# Patient Record
Sex: MALE | Race: WHITE | Employment: OTHER | URBAN - METROPOLITAN AREA
[De-identification: names, ages, dates, MRNs, and addresses within clinical notes are randomized per-mention and may not be internally consistent; named-entity substitution may affect disease eponyms.]

---

## 2018-01-08 ENCOUNTER — OFFICE VISIT (OUTPATIENT)
Dept: FAMILY MEDICINE CLINIC | Facility: CLINIC | Age: 68
End: 2018-01-08

## 2018-01-08 VITALS
TEMPERATURE: 98 F | OXYGEN SATURATION: 98 % | DIASTOLIC BLOOD PRESSURE: 74 MMHG | RESPIRATION RATE: 16 BRPM | HEART RATE: 76 BPM | SYSTOLIC BLOOD PRESSURE: 138 MMHG

## 2018-01-08 DIAGNOSIS — R68.89 FLU-LIKE SYMPTOMS: Primary | ICD-10-CM

## 2018-01-08 PROCEDURE — 99202 OFFICE O/P NEW SF 15 MIN: CPT | Performed by: PHYSICIAN ASSISTANT

## 2018-01-08 RX ORDER — BENZONATATE 200 MG/1
200 CAPSULE ORAL 3 TIMES DAILY PRN
Qty: 30 CAPSULE | Refills: 0 | Status: SHIPPED | OUTPATIENT
Start: 2018-01-08 | End: 2019-01-07

## 2018-01-08 RX ORDER — AMOXICILLIN AND CLAVULANATE POTASSIUM 875; 125 MG/1; MG/1
1 TABLET, FILM COATED ORAL 2 TIMES DAILY
Qty: 20 TABLET | Refills: 0 | Status: SHIPPED | OUTPATIENT
Start: 2018-01-08 | End: 2018-01-18

## 2018-01-09 NOTE — PATIENT INSTRUCTIONS
Influenza  Influenza (“the flu”) is an infection that affects your respiratory tract (the mouth, nose, and lungs, and the passages between them). Unlike a cold, the flu can make you very ill. And it can lead to pneumonia, a serious lung infection.  For · Drink lots of fluids such as water, juice, and warm soup. A good rule is to drink enough so that you urinate your normal amount. · Get plenty of rest.  · Ask your health care provider what to take for fever and pain.   · Call your provider if your fever Handwashing is one of the best ways to prevent many common infections. If you’re caring for or visiting someone with the flu, wash your hands each time you enter and leave the room. Follow these steps:  · Use warm water and plenty of soap.  Rub your hands t

## 2018-01-09 NOTE — PROGRESS NOTES
CHIEF COMPLAINT:   Patient presents with:  URI: painful cough, yellow mucus, feverish feeling. 5 days. started with head cold.        HPI:      Jay Jay José is a 74year old male who presents complaining developing symptoms of sore throat and sinus for icteric, conjunctiva clear  EARS: TM's non erythematous  NOSE: Nasal mucosa congested   THROAT: Posterior pharynx is  erythematous, small PND, no exudates.      NECK: Supple, non-tender, small tonsillar LAD  LUNGS:CTA  R/R/W  CARDIO: S1S/2 RRR  GI: soft NT

## 2019-04-10 PROCEDURE — 81275 KRAS GENE VARIANTS EXON 2: CPT | Performed by: INTERNAL MEDICINE

## 2019-04-10 PROCEDURE — 81276 KRAS GENE ADDL VARIANTS: CPT | Performed by: INTERNAL MEDICINE

## 2019-04-10 PROCEDURE — 88307 TISSUE EXAM BY PATHOLOGIST: CPT | Performed by: INTERNAL MEDICINE

## 2019-04-10 PROCEDURE — 81301 MICROSATELLITE INSTABILITY: CPT | Performed by: INTERNAL MEDICINE

## 2019-04-10 PROCEDURE — 81311 NRAS GENE VARIANTS EXON 2&3: CPT | Performed by: INTERNAL MEDICINE

## 2019-04-10 PROCEDURE — 88341 IMHCHEM/IMCYTCHM EA ADD ANTB: CPT | Performed by: INTERNAL MEDICINE

## 2019-04-10 PROCEDURE — 88381 MICRODISSECTION MANUAL: CPT | Performed by: INTERNAL MEDICINE

## 2019-04-10 PROCEDURE — 88342 IMHCHEM/IMCYTCHM 1ST ANTB: CPT | Performed by: INTERNAL MEDICINE

## 2019-04-18 PROBLEM — C78.02 MALIGNANT NEOPLASM METASTATIC TO LEFT LUNG (HCC): Status: ACTIVE | Noted: 2019-04-18

## 2019-04-18 PROBLEM — C78.01 MALIGNANT NEOPLASM METASTATIC TO RIGHT LUNG (HCC): Status: ACTIVE | Noted: 2019-04-18

## 2019-04-18 PROBLEM — C20 RECTAL CANCER (HCC): Status: ACTIVE | Noted: 2019-04-18

## 2019-04-18 PROBLEM — C78.7 LIVER METASTASIS (HCC): Status: ACTIVE | Noted: 2019-04-18

## 2019-05-29 ENCOUNTER — HOSPITAL ENCOUNTER (EMERGENCY)
Facility: HOSPITAL | Age: 69
Discharge: HOME | End: 2019-05-29
Attending: EMERGENCY MEDICINE
Payer: COMMERCIAL

## 2019-05-29 VITALS
TEMPERATURE: 98 F | RESPIRATION RATE: 16 BRPM | HEART RATE: 65 BPM | WEIGHT: 159 LBS | DIASTOLIC BLOOD PRESSURE: 107 MMHG | OXYGEN SATURATION: 96 % | SYSTOLIC BLOOD PRESSURE: 165 MMHG | BODY MASS INDEX: 24.1 KG/M2 | HEIGHT: 68 IN

## 2019-05-29 DIAGNOSIS — K22.4 ESOPHAGEAL SPASM: Primary | ICD-10-CM

## 2019-05-29 PROCEDURE — 99283 EMERGENCY DEPT VISIT LOW MDM: CPT

## 2019-05-29 RX ORDER — OMEPRAZOLE 20 MG/1
20 CAPSULE, DELAYED RELEASE ORAL
Qty: 30 CAPSULE | Refills: 0 | Status: SHIPPED | OUTPATIENT
Start: 2019-05-29 | End: 2019-07-28

## 2019-05-29 RX ORDER — BACLOFEN 20 MG/1
20 TABLET ORAL 3 TIMES DAILY
Qty: 15 TABLET | Refills: 0 | Status: SHIPPED | OUTPATIENT
Start: 2019-05-29 | End: 2019-06-03

## 2019-05-29 NOTE — ED PROVIDER NOTES
Patient Seen in: [facility] Emergency Department    History   Patient presents with: Allergic Rxn Allergies (immune)    Stated Complaint: throat swelling karen after chemo    HPI    Esophagus closing up today after chemo.   61-year-old man presents to tobacco: Never Used    Alcohol use: No    Drug use: No      Review of Systems    Positive for stated complaint: throat swelling karen after chemo  Other systems are as noted in HPI. Constitutional and vital signs reviewed.       All other systems reviewed an Prescribed:  Discharge Medication List as of 5/29/2019  8:05 PM    START taking these medications    baclofen 20 MG Oral Tab  Take 1 tablet (20 mg total) by mouth 3 (three) times daily for 5 days. , Normal, Disp-15 tablet, R-0    omeprazole 20 MG Oral Capsu

## 2019-05-29 NOTE — ED INITIAL ASSESSMENT (HPI)
Finished chemotherapy at approximately 1330 / patient reports drinking \"weak gatorade solution\" then throat \"shut and I fell to the ground wheezing\" pt reports after tipping head back he could breathe better / ems called to house and directed patient t

## 2019-06-11 PROBLEM — D70.1 CHEMOTHERAPY INDUCED NEUTROPENIA (HCC): Status: ACTIVE | Noted: 2019-06-11

## 2019-06-11 PROBLEM — T45.1X5A CHEMOTHERAPY INDUCED NEUTROPENIA (HCC): Status: ACTIVE | Noted: 2019-06-11

## 2019-10-28 PROBLEM — D69.6 THROMBOCYTOPENIA (HCC): Status: ACTIVE | Noted: 2019-10-28

## 2019-12-08 PROBLEM — R53.1 GENERAL WEAKNESS: Status: ACTIVE | Noted: 2019-12-08

## 2020-10-07 ENCOUNTER — APPOINTMENT (OUTPATIENT)
Dept: CT IMAGING | Facility: HOSPITAL | Age: 70
DRG: 391 | End: 2020-10-07
Attending: EMERGENCY MEDICINE
Payer: COMMERCIAL

## 2020-10-07 ENCOUNTER — HOSPITAL ENCOUNTER (INPATIENT)
Facility: HOSPITAL | Age: 70
LOS: 7 days | Discharge: HOME HEALTH | DRG: 391 | End: 2020-10-16
Attending: EMERGENCY MEDICINE | Admitting: INTERNAL MEDICINE
Payer: COMMERCIAL

## 2020-10-07 DIAGNOSIS — E87.1 HYPONATREMIA: ICD-10-CM

## 2020-10-07 DIAGNOSIS — R10.84 ABDOMINAL PAIN, GENERALIZED: Primary | ICD-10-CM

## 2020-10-07 DIAGNOSIS — K52.9 COLITIS: ICD-10-CM

## 2020-10-07 PROCEDURE — 99285 EMERGENCY DEPT VISIT HI MDM: CPT

## 2020-10-07 PROCEDURE — 96374 THER/PROPH/DIAG INJ IV PUSH: CPT

## 2020-10-07 PROCEDURE — 74177 CT ABD & PELVIS W/CONTRAST: CPT | Performed by: EMERGENCY MEDICINE

## 2020-10-07 PROCEDURE — 96375 TX/PRO/DX INJ NEW DRUG ADDON: CPT

## 2020-10-07 PROCEDURE — 80053 COMPREHEN METABOLIC PANEL: CPT | Performed by: EMERGENCY MEDICINE

## 2020-10-07 PROCEDURE — 96376 TX/PRO/DX INJ SAME DRUG ADON: CPT

## 2020-10-07 PROCEDURE — 83690 ASSAY OF LIPASE: CPT | Performed by: EMERGENCY MEDICINE

## 2020-10-07 PROCEDURE — 96361 HYDRATE IV INFUSION ADD-ON: CPT

## 2020-10-07 PROCEDURE — 81001 URINALYSIS AUTO W/SCOPE: CPT | Performed by: EMERGENCY MEDICINE

## 2020-10-07 PROCEDURE — 85025 COMPLETE CBC W/AUTO DIFF WBC: CPT | Performed by: EMERGENCY MEDICINE

## 2020-10-07 PROCEDURE — 83930 ASSAY OF BLOOD OSMOLALITY: CPT | Performed by: INTERNAL MEDICINE

## 2020-10-07 RX ORDER — SODIUM CHLORIDE 9 MG/ML
INJECTION, SOLUTION INTRAVENOUS CONTINUOUS
Status: DISCONTINUED | OUTPATIENT
Start: 2020-10-07 | End: 2020-10-07

## 2020-10-07 RX ORDER — SODIUM CHLORIDE 9 MG/ML
INJECTION, SOLUTION INTRAVENOUS CONTINUOUS
Status: DISCONTINUED | OUTPATIENT
Start: 2020-10-07 | End: 2020-10-11

## 2020-10-07 RX ORDER — ACETAMINOPHEN 325 MG/1
650 TABLET ORAL EVERY 6 HOURS PRN
Status: DISCONTINUED | OUTPATIENT
Start: 2020-10-07 | End: 2020-10-16

## 2020-10-07 RX ORDER — MORPHINE SULFATE 4 MG/ML
4 INJECTION, SOLUTION INTRAMUSCULAR; INTRAVENOUS ONCE
Status: COMPLETED | OUTPATIENT
Start: 2020-10-07 | End: 2020-10-07

## 2020-10-07 RX ORDER — PANTOPRAZOLE SODIUM 40 MG/1
40 TABLET, DELAYED RELEASE ORAL DAILY
Status: ON HOLD | COMMUNITY
End: 2020-11-09 | Stop reason: SDUPTHER

## 2020-10-07 RX ORDER — BISACODYL 10 MG
10 SUPPOSITORY, RECTAL RECTAL
Status: DISCONTINUED | OUTPATIENT
Start: 2020-10-07 | End: 2020-10-16

## 2020-10-07 RX ORDER — ONDANSETRON 2 MG/ML
4 INJECTION INTRAMUSCULAR; INTRAVENOUS ONCE
Status: COMPLETED | OUTPATIENT
Start: 2020-10-07 | End: 2020-10-07

## 2020-10-07 RX ORDER — ONDANSETRON 2 MG/ML
4 INJECTION INTRAMUSCULAR; INTRAVENOUS EVERY 4 HOURS PRN
Status: DISCONTINUED | OUTPATIENT
Start: 2020-10-07 | End: 2020-10-07 | Stop reason: HOSPADM

## 2020-10-07 RX ORDER — LISINOPRIL 10 MG/1
10 TABLET ORAL DAILY
Status: DISCONTINUED | OUTPATIENT
Start: 2020-10-08 | End: 2020-10-09

## 2020-10-07 RX ORDER — SODIUM PHOSPHATE, DIBASIC AND SODIUM PHOSPHATE, MONOBASIC 7; 19 G/133ML; G/133ML
1 ENEMA RECTAL ONCE AS NEEDED
Status: DISCONTINUED | OUTPATIENT
Start: 2020-10-07 | End: 2020-10-16

## 2020-10-07 RX ORDER — GABAPENTIN 300 MG/1
300 CAPSULE ORAL 3 TIMES DAILY
Status: DISCONTINUED | OUTPATIENT
Start: 2020-10-07 | End: 2020-10-16

## 2020-10-07 RX ORDER — ONDANSETRON 2 MG/ML
4 INJECTION INTRAMUSCULAR; INTRAVENOUS EVERY 6 HOURS PRN
Status: DISCONTINUED | OUTPATIENT
Start: 2020-10-07 | End: 2020-10-16

## 2020-10-07 RX ORDER — METOCLOPRAMIDE HYDROCHLORIDE 5 MG/ML
10 INJECTION INTRAMUSCULAR; INTRAVENOUS EVERY 8 HOURS PRN
Status: DISCONTINUED | OUTPATIENT
Start: 2020-10-07 | End: 2020-10-16

## 2020-10-07 RX ORDER — ENOXAPARIN SODIUM 100 MG/ML
40 INJECTION SUBCUTANEOUS DAILY
Status: DISCONTINUED | OUTPATIENT
Start: 2020-10-07 | End: 2020-10-16

## 2020-10-07 RX ORDER — POLYETHYLENE GLYCOL 3350 17 G/17G
17 POWDER, FOR SOLUTION ORAL DAILY PRN
Status: DISCONTINUED | OUTPATIENT
Start: 2020-10-07 | End: 2020-10-16

## 2020-10-07 RX ORDER — DOCUSATE SODIUM 100 MG/1
100 CAPSULE, LIQUID FILLED ORAL 2 TIMES DAILY
Status: DISCONTINUED | OUTPATIENT
Start: 2020-10-07 | End: 2020-10-16

## 2020-10-07 RX ORDER — SODIUM CHLORIDE 9 MG/ML
INJECTION, SOLUTION INTRAVENOUS CONTINUOUS
Status: ACTIVE | OUTPATIENT
Start: 2020-10-07 | End: 2020-10-07

## 2020-10-07 RX ORDER — MORPHINE SULFATE 4 MG/ML
4 INJECTION, SOLUTION INTRAMUSCULAR; INTRAVENOUS
Status: DISCONTINUED | OUTPATIENT
Start: 2020-10-07 | End: 2020-10-07 | Stop reason: HOSPADM

## 2020-10-07 RX ORDER — PANTOPRAZOLE SODIUM 40 MG/1
40 TABLET, DELAYED RELEASE ORAL
Status: DISCONTINUED | OUTPATIENT
Start: 2020-10-08 | End: 2020-10-16

## 2020-10-07 NOTE — ED PROVIDER NOTES
Patient Seen in: [facility] Emergency Department      History   Patient presents with:  Abdomen/Flank Pain    Stated Complaint: abd pain    HPI    82-year-old with past medical history of stage IV rectal cancer, currently on chemo last 8 days ago, h Constitutional:       Appearance: Normal appearance. HENT:      Head: Normocephalic and atraumatic. Nose: Nose normal.      Mouth/Throat:      Mouth: Mucous membranes are moist.   Eyes:      Extraocular Movements: Extraocular movements intact. PLATELET.   Procedure                               Abnormality         Status                     ---------                               -----------         ------                     CBC W/ DIFFERENTIAL[[id number]]          Abnormal            Final resul the lower pole right kidney measuring up to 2.5 cm. AORTA/VASCULAR:  Mild mixed plaque in the aorta and iliac arteries. RETROPERITONEUM:  Unremarkable. BOWEL/MESENTERY:  Small hiatal hernia. The appendix is dilated and filled with multiple appendicoliths. Clinical correlation recommended. 2. There is mild to moderate thickening of the rectosigmoid colon with underlying nonspecific colitis and/or neoplasm not entirely excluded. Clinical correlation recommended.   3. Urinary bladder wall thickening is concer from his chemotherapy. I reviewed case with Dr. Zenobia Quintana of general surgery. We reviewed patient's history and CT finding. He requested that patient not be started antibiotics until he can be assessed by surgery.   Patient and wife updated on results and ag

## 2020-10-07 NOTE — ED NOTES
Assumed care of patient from [facility].  Pt resting comfortably, states feeling better, updated on POC, NAD

## 2020-10-07 NOTE — CONSULTS
[facility]  Report of Consultation    Sydnee Marycruz Patient Status:  Emergency    1950 MRN [id number]   Location [address] [facility] Attending Perri Dolan Day # 0 PCP Josey Haas MD     Reason for Systems:    GENERAL HEALTH: otherwise feels well, no weight loss, no fever or chills  SKIN: denies any unusual skin rashes or jaundice  HEENT: denies nasal congestion, sinus pain or sore throat; hearing loss negative  RESPIRATORY: denies shortness of breat abnormal but does not appear acutely inflammed nor is his clinical picture consistent with acute appendicitis. Agree with admission for observation and fluids. Will follow.        Harmony Pascual MD  10/7/2020  6:15 PM

## 2020-10-07 NOTE — ED INITIAL ASSESSMENT (HPI)
Pt c/o abdominal cramps with chills yesterday,       pts last chemo last Tuesday, pt is on a new chemo med.

## 2020-10-08 ENCOUNTER — APPOINTMENT (OUTPATIENT)
Dept: GENERAL RADIOLOGY | Facility: HOSPITAL | Age: 70
DRG: 391 | End: 2020-10-08
Attending: INTERNAL MEDICINE
Payer: COMMERCIAL

## 2020-10-08 PROCEDURE — 87077 CULTURE AEROBIC IDENTIFY: CPT | Performed by: INTERNAL MEDICINE

## 2020-10-08 PROCEDURE — 80053 COMPREHEN METABOLIC PANEL: CPT | Performed by: HOSPITALIST

## 2020-10-08 PROCEDURE — 82570 ASSAY OF URINE CREATININE: CPT | Performed by: INTERNAL MEDICINE

## 2020-10-08 PROCEDURE — 87040 BLOOD CULTURE FOR BACTERIA: CPT | Performed by: INTERNAL MEDICINE

## 2020-10-08 PROCEDURE — 87076 CULTURE ANAEROBE IDENT EACH: CPT | Performed by: INTERNAL MEDICINE

## 2020-10-08 PROCEDURE — 80048 BASIC METABOLIC PNL TOTAL CA: CPT | Performed by: HOSPITALIST

## 2020-10-08 PROCEDURE — 84550 ASSAY OF BLOOD/URIC ACID: CPT | Performed by: INTERNAL MEDICINE

## 2020-10-08 PROCEDURE — 85025 COMPLETE CBC W/AUTO DIFF WBC: CPT | Performed by: INTERNAL MEDICINE

## 2020-10-08 PROCEDURE — 87086 URINE CULTURE/COLONY COUNT: CPT | Performed by: INTERNAL MEDICINE

## 2020-10-08 PROCEDURE — 71045 X-RAY EXAM CHEST 1 VIEW: CPT | Performed by: INTERNAL MEDICINE

## 2020-10-08 PROCEDURE — 83605 ASSAY OF LACTIC ACID: CPT | Performed by: INTERNAL MEDICINE

## 2020-10-08 PROCEDURE — 85025 COMPLETE CBC W/AUTO DIFF WBC: CPT | Performed by: HOSPITALIST

## 2020-10-08 PROCEDURE — 84133 ASSAY OF URINE POTASSIUM: CPT | Performed by: INTERNAL MEDICINE

## 2020-10-08 PROCEDURE — 83935 ASSAY OF URINE OSMOLALITY: CPT | Performed by: INTERNAL MEDICINE

## 2020-10-08 PROCEDURE — 84300 ASSAY OF URINE SODIUM: CPT | Performed by: INTERNAL MEDICINE

## 2020-10-08 PROCEDURE — 87150 DNA/RNA AMPLIFIED PROBE: CPT | Performed by: INTERNAL MEDICINE

## 2020-10-08 RX ORDER — VANCOMYCIN HYDROCHLORIDE 125 MG/1
125 CAPSULE ORAL EVERY 6 HOURS
Status: DISCONTINUED | OUTPATIENT
Start: 2020-10-08 | End: 2020-10-16

## 2020-10-08 RX ORDER — SODIUM CHLORIDE 1000 MG
2 TABLET, SOLUBLE MISCELLANEOUS
Status: DISCONTINUED | OUTPATIENT
Start: 2020-10-08 | End: 2020-10-11

## 2020-10-08 RX ORDER — DICYCLOMINE HYDROCHLORIDE 10 MG/1
10 CAPSULE ORAL
Status: DISCONTINUED | OUTPATIENT
Start: 2020-10-08 | End: 2020-10-16

## 2020-10-08 NOTE — PROGRESS NOTES
NURSING ADMISSION NOTE      Patient admitted via Cart  Oriented to room. Safety precautions initiated. Bed in low position. Call light in reach. Assumed care of pt at 2100. Pt here with abdominal pain and diarrhea. Chemo was started 8 days ago.  H

## 2020-10-08 NOTE — H&P
[facility] Hospitalist History and Physical      Patient presents with:  Abdomen/Flank Pain       PCP: Margaret Serrano MD      History of Present Illness: Patient is a 78year old male with PMH sig for stage IV rectal CA on chemo (last dose 9 days ago) and HT Sister         Breast       Review of Systems  Comprehensive ROS reviewed and negative except for what is stated in HPI.       OBJECTIVE:  /67 (BP Location: Left arm)   Pulse 64   Temp 98.1 °F (36.7 °C) (Oral)   Resp 16   Ht 5' 8\" (1.727 m)   Wt 140 transcribed by Technologist)  Patient complains of severe cramping all four quadrants of his abdomen since his last infusion eight days ago.    CONTRAST USED:  61cc of Omnipaque 350  FINDINGS: LUNG BASE:  Scattered pulmonary metastases are again noted in th up to 3.1 x 1.9 cm in the largest focal area on the left measuring up to 1.9 x 1.7 cm which is nonspecific and could be postprocedural, with metastatic implants not entirely excluded. Clinical correlation recommended.  PELVIC ORGANS:  Urinary bladder wall Assessment/Plan:     78 yr old male with PMH sig for stage IV rectal CA on chemo (last dose 9 days ago) and HTN presented to the ED c/o abd pain.      # Abdominal pain with dilated appendix  - appreciate surgery eval, does not feel this represents acute

## 2020-10-08 NOTE — PROGRESS NOTES
[facility]  Progress Note    Babita Kunz Patient Status:  Observation    1950 MRN [id number]   [facility] 4NW-A Attending Mikal Gates,    Hosp Day # 0 PCP Lambert Tee MD     Subjective:    Patient noted nausea/ colorectal ca.    Will review with Jani Granados [facility] Surgery  10/8/2020

## 2020-10-08 NOTE — CONSULTS
[facility]    Report of Consultation    Júnior Ventura Patient Status:  Observation    1950 MRN [id number]   [facility] 4NW-A Attending Ziggy Padilla,    Hosp Day # 0 PCP Aurelia Wise MD       REASON FOR CONSULT: 40 mg, Oral, QAM AC  •  0.9% NaCl infusion, , Intravenous, Continuous  •  Enoxaparin Sodium (LOVENOX) 40 MG/0.4ML injection 40 mg, 40 mg, Subcutaneous, Daily  •  acetaminophen (TYLENOL) tab 650 mg, 650 mg, Oral, Q6H PRN  •  docusate sodium (COLACE) cap 100 CREATSERUM 1.08 10/08/2020    ANIONGAP 5 10/08/2020    GFRNAA 70 10/08/2020    GFRAA 81 10/08/2020    CA 8.3 (L) 10/08/2020    OSMOCALC 263 (L) 10/08/2020    ALKPHO 186 (H) 10/07/2020    AST 42 (H) 10/07/2020    ALT 30 10/07/2020    BILT 0.5 10/07/2020 to admission.  Nephrology consulted for hyponatremia:    Hyponatremia: acute on chronic - may be in the setting of poor solute intake but also consider SIADH in the setting of pulmonary and intracranial metastases  -- check urine osm, urine sodium, urine cr

## 2020-10-08 NOTE — PLAN OF CARE
Problem: PAIN - ADULT  Goal: Verbalizes/displays adequate comfort level or patient's stated pain goal  Description: INTERVENTIONS:  - Encourage pt to monitor pain and request assistance  - Assess pain using appropriate pain scale  - Administer analgesics consumed  - Identify factors contributing to decreased intake, treat as appropriate  - Assist with meals as needed  - Monitor I&O, WT and lab values  - Obtain nutritional consult as needed  - Optimize oral hygiene and moisture  - Encourage food from home; [id number] notified of Neutropenia. ID consult ordered. Dr. Tawana Plummer called for consult. Agrees w/ Zosyn, PO vancomycin added as well.

## 2020-10-08 NOTE — CONSULTS
INFECTIOUS DISEASE CONSULT NOTE    Aimee Tracy Patient Status:  Observation    1950 MRN [id number]   [facility] 4NW-A Attending Yoni Long, DO   Hosp Day # 0 PCP Sherrie Ellis Per Dr. Reddy, no other recommendations. Patient and wife given recommendations.    Medications:   •  Dicyclomine HCl (BENTYL) cap 10 mg, 10 mg, Oral, TID AC&HS  •  sodium chloride tab 2 g, 2 g, Oral, TID CC  •  Piperacillin Sod-Tazobactam So (ZOSYN) 3.375 g in dextrose 5 % 100 mL ADD-vantage, 3.375 g, Intravenous, Q8H  •  gabapentin (ELISABETH arthritis. Neurological: Negative for headaches, dizziness, focal neuro deficits  Behavioral/Psych: Negative for anxiety or depression    Physical Exam:    General: No acute distress.  Sleeping, wakes up but not giving much info  Vital signs: Blood pressur Microbiology    Reviewed in EMR,     Radiology: Ct Abdomen Pelvis Iv Contrast, No Oral (er)    Result Date: 10/7/2020  PROCEDURE:  CT ABDOMEN PELVIS IV CONTRAST, NO ORAL (ER)  COMPARISON:  Outside exam from 8/3/2020.   INDICATIONS:  abd pain  TECHNIQUE: diameter. An underlying mucocele or acute appendicitis are not entirely excluded. Clinical correlation recommended. There is mild to moderate thickening of the rectosigmoid colon with underlying nonspecific colitis and/or neoplasm not entirely excluded. disease again noted. 6. Scattered small amount of abdominal and pelvic ascites. 7. There are changes of a previous vasectomy.   There is stable nonspecific nodular thickening along the bilateral spermatic cords with the largest focal area on the right martine

## 2020-10-09 ENCOUNTER — APPOINTMENT (OUTPATIENT)
Dept: GENERAL RADIOLOGY | Facility: HOSPITAL | Age: 70
DRG: 391 | End: 2020-10-09
Attending: INTERNAL MEDICINE
Payer: COMMERCIAL

## 2020-10-09 PROCEDURE — 74018 RADEX ABDOMEN 1 VIEW: CPT | Performed by: INTERNAL MEDICINE

## 2020-10-09 PROCEDURE — 85025 COMPLETE CBC W/AUTO DIFF WBC: CPT | Performed by: HOSPITALIST

## 2020-10-09 PROCEDURE — 80069 RENAL FUNCTION PANEL: CPT | Performed by: INTERNAL MEDICINE

## 2020-10-09 RX ORDER — SUCRALFATE ORAL 1 G/10ML
1 SUSPENSION ORAL
Status: DISCONTINUED | OUTPATIENT
Start: 2020-10-09 | End: 2020-10-16

## 2020-10-09 NOTE — PROGRESS NOTES
[facility]    Nephrology Progress Note    Jeff Calvin Attending:  Farshad Dobbins DO       SUBJECTIVE:     More sleepy this morning  Has some urinary retention  Blood pressures remain low  He has not been eating/drinking much    PHYSICAL EXAM: NEPRELIM 2.11 10/09/2020    NEUTABS 12.46 (H) 09/09/2019    LYMPHABS 3.61 09/09/2019    EOSABS 0.00 09/09/2019    BASABS 0.00 09/09/2019    NEPERCENT 84.0 10/09/2020    LYPERCENT 9.6 10/09/2020    MOPERCENT 4.4 10/09/2020    EOPERCENT 0.0 10/09/2020 Oral, Daily PRN    •  bisacodyl (DULCOLAX) rectal suppository 10 mg, 10 mg, Rectal, Daily PRN    •  Fleet Enema (FLEET) 7-19 GM/118ML enema 133 mL, 1 enema, Rectal, Once PRN    •  ondansetron HCl (ZOFRAN) injection 4 mg, 4 mg, Intravenous, Q6H PRN    •  Me

## 2020-10-09 NOTE — CONSULTS
[facility]  Report of Consultation    David Gilliland Patient Status:  Observation    1950 MRN [id number]   [facility] 4NW-A Attending Ridge Andersen DO   Hosp Day # 0 PCP Amara Price MD     REASON FOR CONSULTATION: vancomycin HCl (VANCOCIN) cap 125 mg, 125 mg, Oral, Q6H  •  gabapentin (NEURONTIN) cap 300 mg, 300 mg, Oral, TID  •  lisinopril tab 10 mg, 10 mg, Oral, Daily  •  Pantoprazole Sodium (PROTONIX) EC tab 40 mg, 40 mg, Oral, QAM AC  •  0.9% NaCl infusion, , Int UP:     Laboratory Data:      Recent Results (from the past 24 hour(s))   LACTIC ACID, PLASMA    Collection Time: 10/08/20  2:51 PM   Result Value Ref Range    Lactic Acid 1.3 0.4 - 2.0 mmol/L   COMP METABOLIC PANEL (14)    Collection Time: 10/08/20  2:51 Collection Time: 10/08/20  2:51 PM   Result Value Ref Range    Slide Review       Slide reviewed, normal WBC, RBC, and platelet morphology.    SODIUM, URINE, RANDOM    Collection Time: 10/08/20  4:20 PM   Result Value Ref Range    Na Urine Random 115 mmol/L Worsening hepatic metastatic disease. 5. Partially imaged scattered pulmonary metastatic disease again noted. 6. Scattered small amount of abdominal and pelvic ascites. 7. There are changes of a previous vasectomy.   There is stable nonspecific nodular t

## 2020-10-09 NOTE — PLAN OF CARE
Problem: METABOLIC/FLUID AND ELECTROLYTES - ADULT  Goal: Hemodynamic stability and optimal renal function maintained  Description: INTERVENTIONS:  - Monitor labs and assess for signs and symptoms of volume excess or deficit  - Monitor intake, output and

## 2020-10-09 NOTE — DIETARY NOTE
[facility]  NUTRITION INITIAL ASSESSMENT    Pt does not meet malnutrition criteria.     NUTRITION DIAGNOSIS/PROBLEM:  Inadequate oral intake related to stage IV rectal cancer increasing nutrient needs as evidenced by decreased appetite and PO intake mi calories/day (25-35 calories per kg)  Protein: 76-95 grams protein/day (1.2-1.5 grams protein per kg)  Fluid: ~1 ml/kcal or per MD discretion    MONITOR AND EVALUATE/NUTRITION GOALS:  1. PO intake to meet at least 75% patient nutrition prescription  2.  At

## 2020-10-09 NOTE — PLAN OF CARE
Problem: PAIN - ADULT  Goal: Verbalizes/displays adequate comfort level or patient's stated pain goal  Description: INTERVENTIONS:  - Encourage pt to monitor pain and request assistance  - Assess pain using appropriate pain scale  - Administer analgesics (undernourished)  Description: INTERVENTIONS:  - Monitor percentage of each meal consumed  - Identify factors contributing to decreased intake, treat as appropriate  - Assist with meals as needed  - Monitor I&O, WT and lab values  - Obtain nutritional cons pressure (other measures as available)  - Encourage oral intake as appropriate  - Instruct patient on fluid and nutrition restrictions as appropriate  10/9/2020 1558 by Greyson Marquez RN  Outcome: Progressing  10/9/2020 1200 by Gresyon Marquez RN

## 2020-10-09 NOTE — PLAN OF CARE
Patient received alert and oriented, slow to respond. Patient unable to void this shift, assisted X2 to sit up to side of bed at 0500. Patient states he's too weak to stand up. Reports he doesn't feel urge to urinate, Bladder scanner shows >400 ml.  Stra

## 2020-10-09 NOTE — PROGRESS NOTES
[facility] Hospitalist Progress Note                                                                   Michael Hernandez 89  9/2/1950    SUBJECTIVE: No chest pain, palpitation, shortness of breath, co Oral Daily   • Pantoprazole Sodium  40 mg Oral QAM AC   • enoxaparin  40 mg Subcutaneous Daily   • docusate sodium  100 mg Oral BID     Continuous Infusions:   • sodium chloride 100 mL/hr at 10/09/20 0547     PRN: acetaminophen, PEG 3350, magnesium hydroxi

## 2020-10-09 NOTE — PROGRESS NOTES
INFECTIOUS DISEASE PROGRESS NOTE    Jannet Bowie Patient Status:  Observation    1950 MRN [id number]   [facility] 4NW-A Attending Tashi Ariza DO   Hosp Day # 0 PCP Viral for 65 yrs & older inj 0.7ml, 0.7 mL, Intramuscular, Prior to discharge    Review of Systems:  Completed. See pertinent positives and negatives in the the HPI. Physical Exam:    General: No acute distress. Awake. Appears weak.   Vital signs: Blood pressu Negative   PHURINE 7.0   PROUR Negative   UROBILINOGEN 2.0   NITRITE Negative   LEUUR Negative        Microbiology    Reviewed in EMR,     Radiology: Reviewed. PROCEDURE: XR CHEST AP PORTABLE (CPT=71045)     TECHNIQUE: AP chest radiograph was obtained. representative metastasis in the inferior right hepatic lobe measuring 3.4 x 2.6 cm previously measured 1.9 x 1.4 cm. Additional hepatic cysts are seen measuring up to 4.7 cm. BILIARY:  Unremarkable. SPLEEN:  Unremarkable. PANCREAS:  Unremarkable.  Brandy Shepard 1 anterolisthesis of L5 on S1. OTHER:  None. CONCLUSION:   1. The appendix is dilated and filled with multiple appendicoliths. The appendix measures up to 2.5 cm in diameter, previously measuring up to 1.4 cm in diameter.   The largest appendico diarrhea now, but did have diarrhea and took imodium, ? cdiff now leading to colitis and constipation, mild distention on exam and decreased BS  4. Stage IV rectal cancer  5. LETY  6.  Urinary retention, required straight caths overnight    PLAN:  - follow B

## 2020-10-10 PROCEDURE — 85025 COMPLETE CBC W/AUTO DIFF WBC: CPT | Performed by: HOSPITALIST

## 2020-10-10 PROCEDURE — 85027 COMPLETE CBC AUTOMATED: CPT | Performed by: HOSPITALIST

## 2020-10-10 PROCEDURE — 84132 ASSAY OF SERUM POTASSIUM: CPT | Performed by: HOSPITALIST

## 2020-10-10 PROCEDURE — 85007 BL SMEAR W/DIFF WBC COUNT: CPT | Performed by: HOSPITALIST

## 2020-10-10 PROCEDURE — 83735 ASSAY OF MAGNESIUM: CPT | Performed by: HOSPITALIST

## 2020-10-10 PROCEDURE — 80069 RENAL FUNCTION PANEL: CPT | Performed by: INTERNAL MEDICINE

## 2020-10-10 RX ORDER — POTASSIUM CHLORIDE 20 MEQ/1
40 TABLET, EXTENDED RELEASE ORAL EVERY 4 HOURS
Status: DISCONTINUED | OUTPATIENT
Start: 2020-10-10 | End: 2020-10-10

## 2020-10-10 RX ORDER — SENNOSIDES 8.6 MG
8.6 TABLET ORAL 2 TIMES DAILY
Status: DISCONTINUED | OUTPATIENT
Start: 2020-10-10 | End: 2020-10-13

## 2020-10-10 RX ORDER — POTASSIUM CHLORIDE 20 MEQ/1
40 TABLET, EXTENDED RELEASE ORAL ONCE
Status: COMPLETED | OUTPATIENT
Start: 2020-10-10 | End: 2020-10-10

## 2020-10-10 RX ORDER — SIMETHICONE 80 MG
80 TABLET,CHEWABLE ORAL
Status: DISCONTINUED | OUTPATIENT
Start: 2020-10-10 | End: 2020-10-16

## 2020-10-10 RX ORDER — POLYETHYLENE GLYCOL 3350 17 G/17G
17 POWDER, FOR SOLUTION ORAL DAILY
Status: DISCONTINUED | OUTPATIENT
Start: 2020-10-10 | End: 2020-10-13

## 2020-10-10 NOTE — PROGRESS NOTES
[facility] Hospitalist Progress Note                                                                   Michael Hernandez 89  9/2/1950    SUBJECTIVE: No chest pain, palpitation, shortness of breath, co mmol Intravenous Once   • simethicone  80 mg Oral TID CC and HS   • PEG 3350  17 g Oral Daily   • Senna  8.6 mg Oral BID   • sucralfate  1 g Oral TID AC and HS   • dicyclomine  10 mg Oral TID AC&HS   • sodium chloride  2 g Oral TID CC   • piperacillin-tazo and kphos replacement as per renal rec  -trend    # Metastatic rectal CA  - CT a/p showing worsening metastatic disease compared to August CT  - Oncology consulted     # Hyponatremia  - Na improved with sodium supplements   - cont NS   - renal following  -

## 2020-10-10 NOTE — PROGRESS NOTES
[facility]    Nephrology Progress Note    Geni Devries Attending:  Lobo Velazco DO       SUBJECTIVE:     More alert today  Low PO intake still  Has no issues with angel    PHYSICAL EXAM:     Vital Signs: /79 (BP Location: Left arm)   P Fis above normal  Nutrition recommendations include reducing portion sizes  [facility] HEALTH MAINTENANCE OFFICE VISIT  [facility] - [facility]    NAME: Gladys Barclay  AGE: 26 y o  SEX: female  : 1997     DATE: 2020    Assessment and Plan     1  Well adult exam    2  Screening for cardiovascular condition  -     Comprehensive metabolic panel; Future  -     Lipid Panel with Direct LDL reflex; Future    3  Screening for HIV (human immunodeficiency virus)  -     [facility]- Human Immunodeficiency Virus 1/2 Antigen / Antibody ( Fourth Generation) with Reflex Testing; Future    4  BMI 27 0-27 9,adult        · Patient Counseling:   · Nutrition: Stressed importance of a well balanced diet, moderation of sodium/saturated fat, caloric balance and sufficient intake of fiber  · Exercise: Stressed the importance of regular exercise with a goal of 150 minutes per week  · Dental Health: Discussed daily flossing and brushing and regular dental visits     · Immunizations reviewed: Up To Date  · Discussed benefits of:  Screening labs   BMI Counseling: Body mass index is 27 66 kg/m²  Discussed with patient's BMI with her  The BMI is above normal  Nutrition recommendations include reducing portion sizes  Return for Next scheduled follow up          Chief Complaint     Chief Complaint   Patient presents with    Physical Exam     [email or web address]        History of Present Illness     Pt is here for a full physical  PT see DR Castorena       Well Adult Physical   Patient here for a comprehensive physical exam       Diet and Physical Activity  Diet: well balanced diet  Exercise: frequently      Depression Screen  PHQ-9 Depression Screening    PHQ-9:    Frequency of the following problems over the past two weeks:       Little interest or pleasure in doing things:  0 - not at all  Feeling down, depressed, or hopeless:  0 - not at all  PHQ-2 Score:  0          General 2.46 10/10/2020    LYMPHABS 0.22 (L) 10/10/2020    EOSABS 0.00 10/10/2020    BASABS 0.00 10/10/2020    NEUT 62 10/10/2020    LYMPH 8 10/10/2020    MON 4 10/10/2020    EOS 0 10/10/2020    BASO 0 10/10/2020    NEPERCENT 84.0 10/09/2020    LYPERCENT 9.6 10/09 Health  Hearing: Normal:  bilateral  Vision: wears glasses  Dental: regular dental visits    Reproductive Health  No issues       The following portions of the patient's history were reviewed and updated as appropriate: allergies, current medications, past family history, past medical history, past social history, past surgical history and problem list     Review of Systems     Review of Systems   Constitutional: Negative  Negative for activity change, appetite change, chills, diaphoresis and fatigue  HENT: Negative  Negative for dental problem, ear pain, sinus pressure and sore throat  Eyes: Negative  Negative for photophobia, pain, discharge, redness, itching and visual disturbance  Respiratory: Negative for apnea and chest tightness  Cardiovascular: Negative  Negative for chest pain, palpitations and leg swelling  Gastrointestinal: Negative  Negative for abdominal distention, abdominal pain, constipation and diarrhea  Endocrine: Negative  Negative for cold intolerance and heat intolerance  Genitourinary: Negative  Negative for difficulty urinating and dyspareunia  Musculoskeletal: Negative  Negative for arthralgias and back pain  Skin: Negative  Allergic/Immunologic: Negative for environmental allergies  Neurological: Negative  Negative for dizziness  Psychiatric/Behavioral: Negative  Negative for agitation  Past Medical History     History reviewed  No pertinent past medical history      Past Surgical History     Past Surgical History:   Procedure Laterality Date    TONSILLECTOMY AND ADENOIDECTOMY      WISDOM TOOTH EXTRACTION         Social History     Social History     Socioeconomic History    Marital status: Single     Spouse name: None    Number of children: None    Years of education: None    Highest education level: None   Occupational History    None   Social Needs    Financial resource strain: None    Food insecurity     Worry: None     Inability: None hydroxide (MILK OF MAGNESIA) 400 MG/5ML suspension 30 mL, 30 mL, Oral, Daily PRN    •  bisacodyl (DULCOLAX) rectal suppository 10 mg, 10 mg, Rectal, Daily PRN    •  Fleet Enema (FLEET) 7-19 GM/118ML enema 133 mL, 1 enema, Rectal, Once PRN    •  ondansetron  Transportation needs     Medical: None     Non-medical: None   Tobacco Use    Smoking status: Never Smoker    Smokeless tobacco: Never Used   Substance and Sexual Activity    Alcohol use: None    Drug use: Never    Sexual activity: None   Lifestyle    Physical activity     Days per week: None     Minutes per session: None    Stress: None   Relationships    Social connections     Talks on phone: None     Gets together: None     Attends Oriental orthodox service: None     Active member of club or organization: None     Attends meetings of clubs or organizations: None     Relationship status: None    Intimate partner violence     Fear of current or ex partner: None     Emotionally abused: None     Physically abused: None     Forced sexual activity: None   Other Topics Concern    None   Social History Narrative    None       Family History     Family History   Problem Relation Age of Onset    Asthma Mother     Asthma Father     Hypertension Father     Atrial fibrillation Maternal Grandmother     Hyperlipidemia Maternal Grandfather     Thyroid disease Maternal Aunt     Thyroid cancer Paternal Aunt     Allergic rhinitis Family     Eczema Family     Mental illness Neg Hx        Current Medications       Current Outpatient Medications:     amphetamine-dextroamphetamine (ADDERALL) 10 mg tablet, TAKE 1 TABLET BY ORAL ROUTE 2 TIMES EVERY DAY BEFORE BREAKFAST AND LUNCH, Disp: , Rfl:     norgestimate-ethinyl estradiol (ORTHO-CYCLEN) 0 25-35 MG-MCG per tablet, Take 1 tablet by mouth daily, Disp: , Rfl:      Allergies     No Known Allergies    Objective     /60   Pulse 89   Temp 98 2 °F (36 8 °C)   Resp 16   Ht 5' 1 75" (1 568 m)   Wt 68 kg (150 lb)   LMP 08/03/2020 (Exact Date)   SpO2 98%   BMI 27 66 kg/m²      Physical Exam  Vitals signs and nursing note reviewed  Constitutional:       General: She is not in acute distress  Appearance: She is well-developed  She is not diaphoretic     HENT: Head: Normocephalic and atraumatic  Right Ear: External ear normal       Left Ear: External ear normal       Nose: Nose normal       Mouth/Throat:      Pharynx: No oropharyngeal exudate  Eyes:      General: No scleral icterus  Right eye: No discharge  Left eye: No discharge  Pupils: Pupils are equal, round, and reactive to light  Neck:      Thyroid: No thyromegaly  Cardiovascular:      Rate and Rhythm: Normal rate  Heart sounds: Normal heart sounds  No murmur  Pulmonary:      Effort: Pulmonary effort is normal  No respiratory distress  Breath sounds: Normal breath sounds  No wheezing  Abdominal:      General: Bowel sounds are normal  There is no distension  Palpations: Abdomen is soft  There is no mass  Tenderness: There is no abdominal tenderness  There is no guarding or rebound  Musculoskeletal: Normal range of motion  Skin:     General: Skin is warm and dry  Findings: No erythema or rash  Neurological:      Mental Status: She is alert  Coordination: Coordination normal       Deep Tendon Reflexes: Reflexes normal    Psychiatric:         Behavior: Behavior normal             Visual Acuity Screening    Right eye Left eye Both eyes   Without correction:      With correction: 20/25 20/20 20/20           Mulugeta Culver [facility]  BMI Counseling: Body mass index is 27 66 kg/m²  The BMI is above normal  Nutrition recommendations include reducing portion sizes

## 2020-10-10 NOTE — PROGRESS NOTES
INFECTIOUS DISEASE PROGRESS NOTE    Babita Kunz Patient Status:  Observation    1950 MRN [id number]   [facility] 4NW-A Attending Yolanda Coon DO   Hosp Day # 1 PCP Viral Metoclopramide HCl (REGLAN) injection 10 mg, 10 mg, Intravenous, Q8H PRN  •  influenza vaccine (PF) (FLUZONE HD) high dose for 65 yrs & older inj 0.7ml, 0.7 mL, Intramuscular, Prior to discharge    Review of Systems:  Completed.  See pertinent positives and ALT 30  --  30  --   --    BILT 0.5  --  1.0  --   --    TP 6.5  --  5.9*  --   --     < > = values in this interval not displayed.      No results found for: ESRML   No results found for: CRP, HSCRP   No results found for: VANCT  Recent Labs   Lab 10/07/ pneumothorax. Impression: CONCLUSION: Vague nodular density seen in both lungs consistent with metastatic disease.        Dictated by (CST): Octavia Worley MD on 10/08/2020 at 9:09 PM   Finalized by (CST): Octavia Worley MD on 10/08/2020 at 9:12 PM appendix is dilated and filled with multiple appendicoliths. The appendix measures up to 2.5 cm in diameter, previously measuring up to 1.4 cm in diameter. The largest appendicoliths measures up to 1.3 cm in diameter.   An underlying mucocele or acute bertram recommended. 3. Urinary bladder wall thickening is concerning for cystitis. Clinical correlation recommended. 4. Worsening hepatic metastatic disease. 5. Partially imaged scattered pulmonary metastatic disease again noted.   6. Scattered small amount o RN.    Claudia Campuzano. Jacinta Lala PA-C    ID ATTENDING ADDENDUM     Pt seen an examined independently. Chart reviewed. Agree with above. Note has been reviewed by me and modified as needed. Exam and Impression/ Recs as noted above.   D/w staff and with pt  [facility]

## 2020-10-10 NOTE — PLAN OF CARE
Pt alert and oriented x4. Pt up with assist and a walker, standing at the bedside marching in place. VSS and afebrile. Pt lethargic most of the evening then started to perk up overnight. Pt still with abd/ epi gastric pain with movement.   No need for p

## 2020-10-11 PROCEDURE — 86920 COMPATIBILITY TEST SPIN: CPT

## 2020-10-11 PROCEDURE — 97161 PT EVAL LOW COMPLEX 20 MIN: CPT

## 2020-10-11 PROCEDURE — 85025 COMPLETE CBC W/AUTO DIFF WBC: CPT | Performed by: HOSPITALIST

## 2020-10-11 PROCEDURE — 83735 ASSAY OF MAGNESIUM: CPT | Performed by: HOSPITALIST

## 2020-10-11 PROCEDURE — 30233N1 TRANSFUSION OF NONAUTOLOGOUS RED BLOOD CELLS INTO PERIPHERAL VEIN, PERCUTANEOUS APPROACH: ICD-10-PCS | Performed by: INTERNAL MEDICINE

## 2020-10-11 PROCEDURE — 97116 GAIT TRAINING THERAPY: CPT

## 2020-10-11 PROCEDURE — 84100 ASSAY OF PHOSPHORUS: CPT | Performed by: HOSPITALIST

## 2020-10-11 PROCEDURE — 85007 BL SMEAR W/DIFF WBC COUNT: CPT | Performed by: HOSPITALIST

## 2020-10-11 PROCEDURE — 36430 TRANSFUSION BLD/BLD COMPNT: CPT

## 2020-10-11 PROCEDURE — 80053 COMPREHEN METABOLIC PANEL: CPT | Performed by: HOSPITALIST

## 2020-10-11 PROCEDURE — 85027 COMPLETE CBC AUTOMATED: CPT | Performed by: HOSPITALIST

## 2020-10-11 PROCEDURE — 86850 RBC ANTIBODY SCREEN: CPT | Performed by: HOSPITALIST

## 2020-10-11 PROCEDURE — 86901 BLOOD TYPING SEROLOGIC RH(D): CPT | Performed by: HOSPITALIST

## 2020-10-11 PROCEDURE — 87040 BLOOD CULTURE FOR BACTERIA: CPT | Performed by: INTERNAL MEDICINE

## 2020-10-11 PROCEDURE — 86900 BLOOD TYPING SEROLOGIC ABO: CPT | Performed by: HOSPITALIST

## 2020-10-11 PROCEDURE — 85018 HEMOGLOBIN: CPT | Performed by: HOSPITALIST

## 2020-10-11 RX ORDER — SODIUM CHLORIDE 1000 MG
1 TABLET, SOLUBLE MISCELLANEOUS
Status: DISCONTINUED | OUTPATIENT
Start: 2020-10-11 | End: 2020-10-12

## 2020-10-11 RX ORDER — POTASSIUM CHLORIDE 20 MEQ/1
40 TABLET, EXTENDED RELEASE ORAL ONCE
Status: COMPLETED | OUTPATIENT
Start: 2020-10-11 | End: 2020-10-11

## 2020-10-11 RX ORDER — SODIUM CHLORIDE 9 MG/ML
INJECTION, SOLUTION INTRAVENOUS ONCE
Status: COMPLETED | OUTPATIENT
Start: 2020-10-11 | End: 2020-10-11

## 2020-10-11 NOTE — PLAN OF CARE
Pt is aaox4, complaints of tolerable pain on RUQ abdomen, pt states his appetite is slowly improving, consumed 100% breakfast,  angel cath in place, drains clear yellow urine, worked with PT/OT today, ambulates in gregory with walker, ivf infusing.   Completed appropriate  - Advance diet as tolerated, if ordered  - Obtain nutritional consult as needed  - Evaluate fluid balance  Outcome: Progressing  Goal: Maintains or returns to baseline bowel function  Description: INTERVENTIONS:  - Assess bowel function  - Gonsalo Cleary appropriate  Outcome: Progressing  Goal: Hemodynamic stability and optimal renal function maintained  Description: INTERVENTIONS:  - Monitor labs and assess for signs and symptoms of volume excess or deficit  - Monitor intake, output and patient weight  -

## 2020-10-11 NOTE — PROGRESS NOTES
[facility]    Nephrology Progress Note    Aimee Tracy Attending:  Yoni Long DO       SUBJECTIVE:     Had some shortness of breath  Wearing o2  No leg swelling  Tolerating angel    PHYSICAL EXAM:     Vital Signs: /60 (BP Location: L CO2 25.0 10/11/2020     Lab Results   Component Value Date    WBC 2.3 (L) 10/11/2020    RBC 2.05 (L) 10/11/2020    HGB 6.7 (LL) 10/11/2020    HCT 19.3 (L) 10/11/2020    .0 (L) 10/11/2020    MCV 94.1 10/11/2020    MCH 32.7 10/11/2020    MCHC 34.7 mg, Oral, TID AC&HS    •  Piperacillin Sod-Tazobactam So (ZOSYN) 3.375 g in dextrose 5 % 100 mL ADD-vantage, 3.375 g, Intravenous, Q8H    •  vancomycin HCl (VANCOCIN) cap 125 mg, 125 mg, Oral, Q6H    •  gabapentin (NEURONTIN) cap 300 mg, 300 mg, Oral, TID no NSAIDs     Hypokalemia, hypophosphatemia:  -- neutraphos ordered     Abd pain:  -- no surgical intervention planned     Metastatic rectal cancer:  -- FOLFIRI as outpatient     Neutropenic fever:  -- empiric antibiotics per ID     Anemia:  -- consider bl

## 2020-10-11 NOTE — CM/SW NOTE
79 yo admitted with abd pain, dx colitis. HX colon ca, sp chemo. PT is recommending home health. HOME SITUATION  Type of Home: House   Home Layout: Two level  Stairs to Enter : 1  Railing: No  Stairs to Bedroom: 12  Railing:  Yes     Lives With: S

## 2020-10-11 NOTE — HOME CARE LIAISON
Caller would like to discuss an/a medication for imiquimod (ALDARA) 5 % cream. Writer advised caller of callback within 24-72 hours.    Patient Name: Dennis Pettit Jr.  Caller Name: patient   Name of Facility:    Callback Number:  [phone]  Best Availability: anytime   Fax Number:    Additional Info:  Patient went to  the prescription above and it is $150. Patient would like to know if it will be cheaper at [facility] as well. If so, patient would like medication transferred there.  Please advise .          Did you confirm the message with the caller?: yes    Thank you,  Tamar Magallanes     Received referral from [place], [place]. Met with patient and wife at the bedside to discuss home health services and offer choice. Patient is agreeable to [facility] services at discharge. Brochure and contact information provided. Any questions addressed.  Will foll

## 2020-10-11 NOTE — PROGRESS NOTES
[facility]  Progress Note    David Gilliland Patient Status:  Inpatient    1950 MRN [id number]   [facility] 4NW-A Attending Ridge Andersen DO   Hosp Day # 2 PCP Amara Price MD     Subjective:  David Gilliland is a(n) 6 Unremarkable. ADRENALS:  Stable calcifications along the left adrenal gland likely related to previous adrenal hemorrhage. KIDNEYS:  Cyst in the lower pole right kidney measuring up to 2.5 cm.    AORTA/VASCULAR:  Mild mixed plaque in the aorta and iliac a measuring up to 1.4 cm in diameter. The largest appendicoliths measures up to 1.3 cm in diameter. An underlying mucocele or   acute appendicitis are not entirely excluded. Clinical correlation recommended.      2. There is mild to moderate thickening of

## 2020-10-11 NOTE — PROGRESS NOTES
[facility]  Hospitalist Progress Note                                                                   Michael Hernandez 473  9/2/1950    SUBJECTIVE: No chest pain, palpitation, shortness of breath, co input(s): PGLU in the last 168 hours.     Meds:   Scheduled:   • sodium chloride  1 g Oral TID CC   • potassium & sodium phosphates  1 packet Oral TID CC   • simethicone  80 mg Oral TID CC and HS   • PEG 3350  17 g Oral Daily   • Senna  8.6 mg Oral BID   • continue    #Hypokalemia  #Hypophoshotemia   - kdur and neutraphos replacement  -trend    # Metastatic rectal CA  - CT a/p showing worsening metastatic disease compared to August CT  - Oncology following     # Hyponatremia  - Na improved with sodium supple

## 2020-10-11 NOTE — PLAN OF CARE
Pt alert and oriented x4. VSS and afebrile. Pt asked about pain and states that his pain can jump to 10/10 but currently has no pain. Pt reminded to call RN if he experiences any pain overnight.  Pt rounded on frequently and asked about pain and denies p

## 2020-10-11 NOTE — PHYSICAL THERAPY NOTE
PHYSICAL THERAPY EVALUATION - INPATIENT     Room Number: 431/431-A  Evaluation Date: 10/11/2020  Type of Evaluation: Initial  Physician Order: PT Eval and Treat    Presenting Problem: abdominal pain, weakness  Reason for Therapy: Mobility Dysfunction activity  Management Techniques:  Body mechanics;Repositioning    COGNITION  · Overall Cognitive Status:  WFL - within functional limits    RANGE OF MOTION AND STRENGTH ASSESSMENT  Upper extremity ROM and strength are within functional limits     Lower extr bed, agreeable to PT eval. Pt completes supine to sit with min assist for LE's, with supine to sidelying to sit. Pt sits EOB with supervision. Sit to stand with cga and cueing for hand placement up to r/w.  Gait x 200 ft with r/w and cga, no LOB noted, mild functional limitations in independent bed mobility, transfers, gait and stair negotiation.   The patient is below baseline and would benefit from skilled inpatient PT to address the above deficits to assist patient in returning to prior to level of function

## 2020-10-12 ENCOUNTER — APPOINTMENT (OUTPATIENT)
Dept: CT IMAGING | Facility: HOSPITAL | Age: 70
DRG: 391 | End: 2020-10-12
Attending: PHYSICIAN ASSISTANT
Payer: COMMERCIAL

## 2020-10-12 PROCEDURE — 97530 THERAPEUTIC ACTIVITIES: CPT

## 2020-10-12 PROCEDURE — 80069 RENAL FUNCTION PANEL: CPT | Performed by: INTERNAL MEDICINE

## 2020-10-12 PROCEDURE — 83735 ASSAY OF MAGNESIUM: CPT | Performed by: HOSPITALIST

## 2020-10-12 PROCEDURE — 97535 SELF CARE MNGMENT TRAINING: CPT

## 2020-10-12 PROCEDURE — 74177 CT ABD & PELVIS W/CONTRAST: CPT | Performed by: PHYSICIAN ASSISTANT

## 2020-10-12 PROCEDURE — 97166 OT EVAL MOD COMPLEX 45 MIN: CPT

## 2020-10-12 PROCEDURE — 97116 GAIT TRAINING THERAPY: CPT

## 2020-10-12 PROCEDURE — 85025 COMPLETE CBC W/AUTO DIFF WBC: CPT | Performed by: HOSPITALIST

## 2020-10-12 RX ORDER — SODIUM CHLORIDE 9 MG/ML
INJECTION, SOLUTION INTRAVENOUS CONTINUOUS
Status: DISCONTINUED | OUTPATIENT
Start: 2020-10-12 | End: 2020-10-13

## 2020-10-12 RX ORDER — SODIUM CHLORIDE 1000 MG
1 TABLET, SOLUBLE MISCELLANEOUS 2 TIMES DAILY WITH MEALS
Status: DISCONTINUED | OUTPATIENT
Start: 2020-10-12 | End: 2020-10-13

## 2020-10-12 NOTE — PLAN OF CARE
Problem: Impaired Activities of Daily Living  Goal: Achieve highest/safest level of independence in self care  Description: Interventions:  - Assess ability and encourage patient to participate in ADLs to maximize function  - Promote sitting position [facility]

## 2020-10-12 NOTE — DIETARY NOTE
[facility] ASSESSMENT    Pt does not meet malnutrition criteria.     NUTRITION DIAGNOSIS/PROBLEM:  Inadequate oral intake related to stage IV rectal cancer increasing nutrient needs as evidenced by decreased appetite and PO intak Preferences Addresses: Yes    NUTRITION RELATED PHYSICAL FINDINGS:  1. Body Fat/Muscle Mass: ALLIE  2. Fluid Accumulation: none per RN documentation.     NUTRITION PRESCRIPTION: 63.5 kg  Calories: 3775-3448 calories/day (25-35 calories per kg)  Protein: 76-95

## 2020-10-12 NOTE — PLAN OF CARE
Patient A&O x 4.  VS stable, afebrile. Mild complaints of pain intermittency, patient declining pain medications. One loose stool overnight. IV fluids and antibiotics as ordered. Continue to monitor, call light within reach.        Problem: Patient/Fami INTERVENTIONS:  - Assess bowel function  - Maintain adequate hydration with IV or PO as ordered and tolerated  - Evaluate effectiveness of GI medications  - Encourage mobilization and activity  - Obtain nutritional consult as needed  - Establish a toiletin ordered  - Monitor response to electrolyte replacements, including rhythm and repeat lab results as appropriate  - Fluid restriction as ordered  - Instruct patient on fluid and nutrition restrictions as appropriate  Outcome: Progressing  Goal: Hemodynamic

## 2020-10-12 NOTE — PROGRESS NOTES
[facility] Hospitalist Progress Note                                                                   Michael Hernandez [address]  9/2/1950    SUBJECTIVE: No chest pain, palpitation, productive cough, nause 10/12/20  0618   ALT 30 30  --   --  27  --    AST 42* 48*  --   --  43*  --    ALB 3.6 3.1* 2.8* 2.5* 2.2*  2.2* 2.1*       No results for input(s): PGLU in the last 168 hours.     Meds:   Scheduled:   • sodium chloride  1 g Oral BID with meals   • simethi uncertain  - possibly secondary to infection --> ID, IV abx, blood cx 2/2 pos gram pos maria e, repeat blood cx ordered and ngtd  - possible secondary to dehydration --> IVF given--> stopped as BP currently stable    #Hypokalemia  #Hypophoshotemia   - kdur and

## 2020-10-12 NOTE — OCCUPATIONAL THERAPY NOTE
OCCUPATIONAL THERAPY EVALUATION - INPATIENT     Room Number: 431/431-A  Evaluation Date: 10/12/2020  Type of Evaluation: Initial  Presenting Problem: abdominal pain    Physician Order: IP Consult to Occupational Therapy  Reason for Therapy: ADL/IADL Dysfun WFL - within functional limits    SENSATION  Light touch:  intact    Communication: Pt is able to communicate all basic needs and wants    Behavioral/Emotional/Social: Pt was participated in and was motivated for therapy today.     RANGE OF MOTION AND STREN session/findings; All patient questions and concerns addressed;SCDs in place; Family present    ASSESSMENT     Patient is a 78year old male admitted on 10/7/2020 for abdominal pain. Complete medical history and occupational profile noted above.  Functional o dressing:  with supervision  Patient will perform lower body dressing:  with supervision  Patient will perform toileting: with supervision    Functional Transfer Goals  Patient will transfer from supine to sit:  with supervision  Patient will transfer from

## 2020-10-12 NOTE — PROGRESS NOTES
INFECTIOUS DISEASE PROGRESS NOTE    Gil Robles Patient Status:  Observation    1950 MRN [id number]   [facility] 4NW-A Attending Raimundo Landon DO   Hosp Day # 3 PCP Viral mg, 4 mg, Intravenous, Q6H PRN  •  Metoclopramide HCl (REGLAN) injection 10 mg, 10 mg, Intravenous, Q8H PRN  •  influenza vaccine (PF) (FLUZONE HD) high dose for 65 yrs & older inj 0.7ml, 0.7 mL, Intramuscular, Prior to discharge    Review of Systems:  Com 7. 6* 7.7*   ALB 3.6  --  3.1*   < > 2.5*  --  2.2*  2.2* 2.1*   *   < > 129*   < > 135*  --  138  138 137   K 3.9   < > 3.5   < > 3.3* 4.0 3.5  3.5 3.8   CL 93*   < > 97*   < > 104  --  110  110 110   CO2 29.0   < > 25.0   < > 25.0  --  25.0  25.0 24 PROCEDURE: XR CHEST AP PORTABLE (CPT=71045)     TECHNIQUE: AP chest radiograph was obtained. COMPARISON: [facility] , CT, CT ABDOMEN PELVIS IV CONTRAST, NO ORAL (ER), 10/07/2020, 4:18 PM.     INDICATIONS: Fever. Hypotension.      PATIENT STATED HIST to 4.7 cm. BILIARY:  Unremarkable. SPLEEN:  Unremarkable. PANCREAS:  Unremarkable. ADRENALS:  Stable calcifications along the left adrenal gland likely related to previous adrenal hemorrhage.  KIDNEYS:  Cyst in the lower pole right kidney measuring up to 2 to 2.5 cm in diameter, previously measuring up to 1.4 cm in diameter. The largest appendicoliths measures up to 1.3 cm in diameter. An underlying mucocele or acute appendicitis are not entirely excluded. Clinical correlation recommended.   2. There is mi cdiff initially leading to colitis and constipation, mild distention on exam and decreased BS  5. Stage IV rectal cancer  6. LETY, improving  7. Urinary retention, Levin placed 10/9.     PLAN:  - await final ID on blood cultures   - follow repeat BCxs to doc

## 2020-10-12 NOTE — PROGRESS NOTES
[facility]  Progress Note    Javier Hernandez Patient Status:  Inpatient    1950 MRN [id number]   [facility] 4NW-A Attending Jonathan Mendez,    Hosp Day # 3 PCP Tommy Haywood MD     Subjective:  Javier Hernandez is a(n) 6 Clinical correlation recommended. 3. Urinary bladder wall thickening is concerning for cystitis. Clinical correlation recommended. 4. Worsening hepatic metastatic disease.      5. Partially imaged scattered pulmonary metastatic disease again note

## 2020-10-12 NOTE — PHYSICAL THERAPY NOTE
PHYSICAL THERAPY TREATMENT NOTE - INPATIENT    Room Number: 431/431-A     Session: 1   Number of Visits to Meet Established Goals: 5    Presenting Problem: abdominal pain, weakness     History related to current admission: Pt is 79 yo male admitted 10/8/2 and standing up from a chair with arms (e.g., wheelchair, bedside commode, etc.): A Little   -   Moving from lying on back to sitting on the side of the bed?: A Little   How much help from another person does the patient currently need. ..   -   Moving to a negotiation. The patient is below baseline and would benefit from skilled inpatient PT to address the above deficits to assist patient in returning to prior to level of function.     DISCHARGE RECOMMENDATIONS  PT Discharge Recommendations: Home with home h

## 2020-10-12 NOTE — PLAN OF CARE
Pt. A&O x4. VSS. Afebrile. No c/o SOB. Weaned patient off of O2. Pt. O2 sats mid-high 90's on room air. IV antibiotics. Plan for CT of abdomen and pelvis. Pt. To get IVF 0.9 @ 60cc/hr before the CT and for 6 hours after the CT per nephrology.  Pt. C/o RLQ a hydration with IV or PO as ordered and tolerated  - Evaluate effectiveness of GI medications  - Encourage mobilization and activity  - Obtain nutritional consult as needed  - Establish a toileting routine/schedule  - Consider collaborating with pharmacy to

## 2020-10-12 NOTE — PROGRESS NOTES
[facility]    Nephrology Progress Note    Liam Mccabe Attending:  Mary Collins DO       SUBJECTIVE:        No leg swelling  Tolerating angel    PHYSICAL EXAM:     Vital Signs: /78 (BP Location: Left arm)   Pulse 56   Temp 97.9 °F (36. 10/11/2020    EOSABS 0.05 10/11/2020    BASABS 0.00 10/11/2020    NEUT 76 10/11/2020    LYMPH 8 10/11/2020    MON 2 10/11/2020    EOS 2 10/11/2020    BASO 0 10/11/2020    NEPERCENT 68.1 10/12/2020    LYPERCENT 13.5 10/12/2020    MOPERCENT 11.5 10/12/2020 cap 100 mg, 100 mg, Oral, BID    •  PEG 3350 (MIRALAX) powder packet 17 g, 17 g, Oral, Daily PRN    •  magnesium hydroxide (MILK OF MAGNESIA) 400 MG/5ML suspension 30 mL, 30 mL, Oral, Daily PRN    •  bisacodyl (DULCOLAX) rectal suppository 10 mg, 10 mg, [facility]  [facility]  [address] [facility]  [place], [address] [facility]

## 2020-10-13 PROCEDURE — 80069 RENAL FUNCTION PANEL: CPT | Performed by: HOSPITALIST

## 2020-10-13 PROCEDURE — 85025 COMPLETE CBC W/AUTO DIFF WBC: CPT | Performed by: HOSPITALIST

## 2020-10-13 RX ORDER — SODIUM CHLORIDE 1000 MG
1 TABLET, SOLUBLE MISCELLANEOUS DAILY
Status: DISCONTINUED | OUTPATIENT
Start: 2020-10-14 | End: 2020-10-16

## 2020-10-13 NOTE — PROGRESS NOTES
[facility] Hospitalist Progress Note                                                                   Michael Hernandez [birth year]  9/2/1950    SUBJECTIVE: No chest pain, palpitation, productive cough, sob, ALB 3.6 3.1* 2.8* 2.5* 2.2*  2.2* 2.1* 2.3*       No results for input(s): PGLU in the last 168 hours.     Meds:   Scheduled:   • sodium chloride  1 g Oral BID with meals   • simethicone  80 mg Oral TID CC and HS   • sucralfate  1 g Oral TID AC and HS   • to hypotension/atn  - renal following, follow rec    # Hypotension--> improved  - etiology uncertain  - possibly secondary to infection --> ID, IV abx, blood cx 2/2 pos gram pos maria e, repeat blood cx ordered and ngtd  - possible secondary to dehydration -->

## 2020-10-13 NOTE — PLAN OF CARE
Pt. A&O x4. VSS. No c/o SOB. Pt. On room air all day; O2 sats mid-high 90's. IV antibiotics. Pt. States his abdominal pain is about the same as yesterday. Pt. With poor appetite; not eating much throughout the day.  Levin catheter patent draining clear yell effectiveness of GI medications  - Encourage mobilization and activity  - Obtain nutritional consult as needed  - Establish a toileting routine/schedule  - Consider collaborating with pharmacy to review patient's medication profile  Outcome: Progressing electrolyte replacements, including rhythm and repeat lab results as appropriate  - Fluid restriction as ordered  - Instruct patient on fluid and nutrition restrictions as appropriate  Outcome: Progressing     Problem: METABOLIC/FLUID AND ELECTROLYTES - AD

## 2020-10-13 NOTE — PROGRESS NOTES
[facility]    Nephrology Progress Note    Joseph Hanson Attending:  Chely Elizondo DO       SUBJECTIVE:        No leg swelling  Tolerating angel    PHYSICAL EXAM:     Vital Signs: /77 (BP Location: Left arm)   Pulse 59   Temp 98 °F (36.7 10/11/2020    EOSABS 0.05 10/11/2020    BASABS 0.00 10/11/2020    NEUT 76 10/11/2020    LYMPH 8 10/11/2020    MON 2 10/11/2020    EOS 2 10/11/2020    BASO 0 10/11/2020    NEPERCENT 62.6 10/13/2020    LYPERCENT 18.0 10/13/2020    MOPERCENT 10.6 10/13/2020 Daily    •  acetaminophen (TYLENOL) tab 650 mg, 650 mg, Oral, Q6H PRN    •  docusate sodium (COLACE) cap 100 mg, 100 mg, Oral, BID    •  PEG 3350 (MIRALAX) powder packet 17 g, 17 g, Oral, Daily PRN    •  magnesium hydroxide (MILK OF MAGNESIA) 400 MG/5ML briscoe with questions or concerns.     Chely Gaxiola MD  [facility]  [facility] [address]  [address] [facility]  [place], [address] [facility]

## 2020-10-13 NOTE — PROGRESS NOTES
[facility]  Progress Note    Autumn Gupta Patient Status:  Inpatient    1950 MRN [id number]   [facility] 4NW-A Attending Jeni Franco, DO   Hosp Day # 4 PCP Lion Chou MD     Subjective:  Autumn Gupta is a(n) 6 Kaia Tatum MD on 10/12/2020 at 8:49 PM     Finalized by (CST): Kaia Tatum MD on 10/12/2020 at 8:58 PM           Assessment and Plan:    1.  Colon cancer - metastatic to lung and liver  On FOLFIRI  No treatment while in-patient   Further treatment/timing to

## 2020-10-13 NOTE — PROGRESS NOTES
INFECTIOUS DISEASE PROGRESS NOTE    Dwayne Oneil Patient Status:  Observation    1950 MRN [id number]   [facility] 4NW-A Attending Reema Palacios, DO   Hosp Day # 4 PCP Viral 0.7 mL, Intramuscular, Prior to discharge    Review of Systems:  Completed. See pertinent positives and negatives in the the HPI. Physical Exam:    General: No acute distress. Awake.  On room air  Vital signs: Blood pressure 125/77, pulse 59, temperature K 3.9   < > 3.5   < > 3.3* 4.0 3.5  3.5 3.8   CL 93*   < > 97*   < > 104  --  110  110 110   CO2 29.0   < > 25.0   < > 25.0  --  25.0  25.0 24.0   ALKPHO 186*  --  158*  --   --   --  87  --    AST 42*  --  48*  --   --   --  43*  --    ALT 30  --  30  - accounting for differences in technique. Numerous other hepatic lesions are again   noted. These appear grossly stable. The spleen, pancreas, adrenal glands and kidneys also appear essentially stable.  A probable cyst is noted of the right kidney measuring prominence from the prior exam although differential considerations from the prior exam are unchanged and continued tension in this region on follow-up studies   is suggested. Please refer to recent CT for further details.      Numerous other findings inclu patient, patient's wife, hospitalist and RN. Breanne Torres PA-C    ID ATTENDING ADDENDUM     Pt seen an examined independently. Chart reviewed. Agree with above. Note has been reviewed by me and modified as needed.   Exam and Impression/ Recs as

## 2020-10-13 NOTE — PLAN OF CARE
Patient A&O x 4.  VS stable, afebrile. Mild complaints of pain intermittency, patient declining pain medications. No loose stools overnight. IV antibiotics as ordered. CT ab- ascites.   Contrast given prior to exam, IV fluids 6hrs post scan per Nephrolo Progressing  Goal: Maintains or returns to baseline bowel function  Description: INTERVENTIONS:  - Assess bowel function  - Maintain adequate hydration with IV or PO as ordered and tolerated  - Evaluate effectiveness of GI medications  - Encourage mobiliza and symptoms of electrolyte imbalances  - Administer electrolyte replacement as ordered  - Monitor response to electrolyte replacements, including rhythm and repeat lab results as appropriate  - Fluid restriction as ordered  - Instruct patient on fluid and

## 2020-10-13 NOTE — CONSULTS
[facility]    Report of Gastroenterology Consultation    Jeff Calvin Patient Status:  Inpatient    1950 MRN [id number]   [facility] 4NW-A Attending Farshad Dobbins,    Hosp Day # 4 PCP Sathya Smalls MD     Date of Family History   Problem Relation Age of Onset   • Other (Other) Maternal Grandfather         tb   • Cancer Maternal Grandmother         UNKNOWN   • Cancer Sister         Breast      reports that he has never smoked.  He has never used smokeless tobacco mL, Intramuscular, Prior to discharge    Review of Systems:  Gastrointestinal: Denies positive test for blood stool, heartburn/indigestion/reflux, belching, difficulty swallowing, irregular bowel habits, painful swallowing, diarrhea, abdominal pain, consti osteoporosis. Heme/Lymphatic: Denies easy bruising, anemia, excessive bleeding, enlarging or painful lymph nodes. Allergy: Denies medication allergy, latex/rubber allergy, anaphylactic or other reaction to anesthesia, food allergy.    Eyes: Denies blurred Patient Active Problem List:     Rectal cancer ([facility] [address]     Liver metastasis ([facility] [address]     Malignant neoplasm metastatic to right lung [facility])     Malignant neoplasm metastatic to left lung ([facility] [address]     Chemotherapy induced neutropenia (H[facility])     Thrombocytopenia (

## 2020-10-14 PROCEDURE — 97116 GAIT TRAINING THERAPY: CPT

## 2020-10-14 PROCEDURE — 85025 COMPLETE CBC W/AUTO DIFF WBC: CPT | Performed by: HOSPITALIST

## 2020-10-14 PROCEDURE — 85027 COMPLETE CBC AUTOMATED: CPT | Performed by: HOSPITALIST

## 2020-10-14 PROCEDURE — 97110 THERAPEUTIC EXERCISES: CPT

## 2020-10-14 PROCEDURE — 83735 ASSAY OF MAGNESIUM: CPT | Performed by: HOSPITALIST

## 2020-10-14 PROCEDURE — 80069 RENAL FUNCTION PANEL: CPT | Performed by: INTERNAL MEDICINE

## 2020-10-14 PROCEDURE — 85007 BL SMEAR W/DIFF WBC COUNT: CPT | Performed by: HOSPITALIST

## 2020-10-14 RX ORDER — POTASSIUM CHLORIDE 14.9 MG/ML
20 INJECTION INTRAVENOUS ONCE
Status: COMPLETED | OUTPATIENT
Start: 2020-10-14 | End: 2020-10-14

## 2020-10-14 RX ORDER — MAGNESIUM OXIDE 400 MG (241.3 MG MAGNESIUM) TABLET
800 TABLET ONCE
Status: COMPLETED | OUTPATIENT
Start: 2020-10-14 | End: 2020-10-14

## 2020-10-14 RX ORDER — POTASSIUM CHLORIDE 29.8 MG/ML
40 INJECTION INTRAVENOUS ONCE
Status: COMPLETED | OUTPATIENT
Start: 2020-10-14 | End: 2020-10-14

## 2020-10-14 NOTE — PROGRESS NOTES
INFECTIOUS DISEASE PROGRESS NOTE    Antwan Jarrod Patient Status:  Observation    1950 MRN [id number]   [facility] 4NW-A Attending Kait Glez DO   Hosp Day # 5 PCP Viral Daily PRN  •  bisacodyl (DULCOLAX) rectal suppository 10 mg, 10 mg, Rectal, Daily PRN  •  Fleet Enema (FLEET) 7-19 GM/118ML enema 133 mL, 1 enema, Rectal, Once PRN  •  ondansetron HCl (ZOFRAN) injection 4 mg, 4 mg, Intravenous, Q6H PRN  •  Metoclopramide H 10/07/20  1402 10/07/20  1402 10/08/20  1451 10/08/20  1451 10/11/20  0641 10/12/20  0618 10/13/20  1247 10/14/20  0449   GLU 98   < > 97   < > 96  96 94 96 96   BUN 18   < > 13   < > 15  15 12 10 9   CREATSERUM 1.13   < > 0.90   < > 1.09  1.09 1.03 1.10 0 distention. 4. Colitis vs malignancy- no diarrhea for multiple days here. .. now with diarrhea but after receiving senna, miralax, colace.  Had diarrhea prior to admission and took imodium, ? cdiff initially leading to colitis and constipation, mild distent

## 2020-10-14 NOTE — PROGRESS NOTES
[facility] Hospitalist Progress Note                                                                   Michael Hernandez 473  9/2/1950    SUBJECTIVE:  Pt seen and examined.   States his abd cramping is in the last 168 hours.     Meds:   Scheduled:   • potassium chloride  40 mEq Intravenous Once    Followed by   • potassium chloride  20 mEq Intravenous Once   • potassium & sodium phosphates  1 packet Oral TID CC   • sodium chloride  1 g Oral Daily   • jim abx  -ID following.      # LETY--> resolved  - etiology likely urinary retention  - Levin placed for urinary retention   - may also be do to hypotension/atn  - renal following, follow rec    # Hypotension--> improved  - etiology uncertain  - possibly seconda

## 2020-10-14 NOTE — PROGRESS NOTES
Patient appetite poor this shift wife was able to get patient to drink a health drink. Patient has been up ambulating in hallway with stand by assist from staff and the use of a walker.  Denies any c/o pain patient informed that a stool was needed to send o

## 2020-10-14 NOTE — DIETARY NOTE
[facility] [facility] ASSESSMENT    Pt does not meet malnutrition criteria.     NUTRITION DIAGNOSIS/PROBLEM:  Inadequate oral intake related to stage IV rectal cancer increasing nutrient needs as evidenced by decreased appetite and PO intak lb 6.4 oz)  07/20/20 : 65.7 kg (144 lb 12.8 oz)    Patient Weight for the past 72 hrs:   Weight   10/07/20 1352 63.5 kg (140 lb)     NUTRITION:  Diet: Regular/General  Oral Supplements: Ensure Enlive    FOOD/NUTRITION RELATED HISTORY:  Appetite:poor  Intak

## 2020-10-14 NOTE — PHYSICAL THERAPY NOTE
PHYSICAL THERAPY TREATMENT NOTE - INPATIENT    Room Number: 431/431-A     Session: 2  Number of Visits to Meet Established Goals: 5    Presenting Problem: abdominal pain, weakness     History related to current admission: Pt is 77 yo male admitted 10/8/20 sitting on the side of the bed?: None   How much help from another person does the patient currently need. ..   -   Moving to and from a bed to a chair (including a wheelchair)?: A Little   -   Need to walk in hospital room?: A Little   -   Climbing 3-5 kameron rectal CA, HTN. The patient presents with the following impairments :decreased cardiopulmonary endurance, decreased balance. Functional outcome measures completed include AM-PAC.  These impairments and comorbidities manifest themselves as functional limit

## 2020-10-14 NOTE — PLAN OF CARE
Patient received alert and oriented, no complaints of pain. Ambulatory to the bathroom with rolling walker and stby assistance. Levin remains patent and intact, patient requesting sleep aide at  md notified with new orders received and carried out.  Ruby

## 2020-10-14 NOTE — PROGRESS NOTES
[facility]  Progress Note    Jeff Calvin Patient Status:  Inpatient    1950 MRN [id number]   [facility] 4NW-A Attending Farshad Dobbins, DO   Hosp Day # 5 PCP Sathya Smalls MD     Subjective:  Jeff Calvin is a(n) 6 details.   Numerous other findings including areas of metastatic disease appear overall stable from the prior exam.   Dictated by (CST): Kaia Tatum MD on 10/12/2020 at 8:49 PM     Finalized by (CST): Kaia Tatum MD on 10/12/2020 at 8:58 PM           Assessme

## 2020-10-14 NOTE — PROGRESS NOTES
[facility]    Nephrology Progress Note    Júnior Ventura Attending:  Ziggy Padilla DO       SUBJECTIVE:        No leg swelling  Tolerating angel  Po intake poor   diarrhea    PHYSICAL EXAM:     Vital Signs: /84 (BP Location: Left arm)   P 10/14/2020    LYMPHABS 1.19 10/14/2020    EOSABS 0.14 10/14/2020    BASABS 0.00 10/14/2020    NEUT 72 10/14/2020    LYMPH 17 10/14/2020    MON 3 10/14/2020    EOS 2 10/14/2020    BASO 0 10/14/2020    NEPERCENT 62.6 10/13/2020    LYPERCENT 18.0 10/13/2020 acetaminophen (TYLENOL) tab 650 mg, 650 mg, Oral, Q6H PRN    •  docusate sodium (COLACE) cap 100 mg, 100 mg, Oral, BID    •  PEG 3350 (MIRALAX) powder packet 17 g, 17 g, Oral, Daily PRN    •  magnesium hydroxide (MILK OF MAGNESIA) 400 MG/5ML suspension 30 hesitate to call with questions or concerns.     Michaela Cole MD  [address] [facility]  [address]  [address] [facility]  [place], [address]

## 2020-10-15 PROCEDURE — 97530 THERAPEUTIC ACTIVITIES: CPT

## 2020-10-15 PROCEDURE — 97116 GAIT TRAINING THERAPY: CPT

## 2020-10-15 PROCEDURE — 83735 ASSAY OF MAGNESIUM: CPT | Performed by: HOSPITALIST

## 2020-10-15 PROCEDURE — 84132 ASSAY OF SERUM POTASSIUM: CPT | Performed by: HOSPITALIST

## 2020-10-15 PROCEDURE — 85025 COMPLETE CBC W/AUTO DIFF WBC: CPT | Performed by: INTERNAL MEDICINE

## 2020-10-15 PROCEDURE — 87493 C DIFF AMPLIFIED PROBE: CPT | Performed by: PHYSICIAN ASSISTANT

## 2020-10-15 PROCEDURE — 97535 SELF CARE MNGMENT TRAINING: CPT

## 2020-10-15 PROCEDURE — 83735 ASSAY OF MAGNESIUM: CPT | Performed by: INTERNAL MEDICINE

## 2020-10-15 PROCEDURE — 84132 ASSAY OF SERUM POTASSIUM: CPT | Performed by: INTERNAL MEDICINE

## 2020-10-15 PROCEDURE — 80069 RENAL FUNCTION PANEL: CPT | Performed by: INTERNAL MEDICINE

## 2020-10-15 RX ORDER — MAGNESIUM OXIDE 400 MG (241.3 MG MAGNESIUM) TABLET
400 TABLET ONCE
Status: COMPLETED | OUTPATIENT
Start: 2020-10-15 | End: 2020-10-15

## 2020-10-15 RX ORDER — POTASSIUM CHLORIDE 29.8 MG/ML
40 INJECTION INTRAVENOUS ONCE
Status: COMPLETED | OUTPATIENT
Start: 2020-10-15 | End: 2020-10-15

## 2020-10-15 RX ORDER — POTASSIUM CHLORIDE 14.9 MG/ML
20 INJECTION INTRAVENOUS ONCE
Status: COMPLETED | OUTPATIENT
Start: 2020-10-15 | End: 2020-10-15

## 2020-10-15 NOTE — OCCUPATIONAL THERAPY NOTE
OCCUPATIONAL THERAPY TREATMENT NOTE - INPATIENT     Room Number: 431/431-A  Session: 1   Number of Visits to Meet Established Goals: 5    Presenting Problem: abdominal pain    History related to current admission: Pt is 79 yo male admitted 10/8/20 from [facility]    FUNCTIONAL TRANSFER ASSESSMENT  Supine to Sit : Supervision  Sit to Stand: Supervision    Skilled Therapy Provided:   PPE: Surgical mask, disposable isolation gown, and gloves worn. Supine <> sit supervision.  Sit to stand supervision via elton ONTIVEROS transfer from supine to sit:  with supervision-met 10/15  Patient will transfer from sit to stand:  with supervision-met 10/15  Patient will transfer to toilet:  with supervision-met 10/15     UE Exercise Program Goal  Patient will be supervision with edward

## 2020-10-15 NOTE — PROGRESS NOTES
[facility]  Progress Note    Liam Mccabe Patient Status:  Inpatient    1950 MRN [id number]   [facility] 4NW-A Attending Mary Collins, DO   Hosp Day # 6 PCP Carlitos Pena MD     Subjective:  Liam Mccabe is a(n) 6 Dictated by (CST): Joice Curling, MD on 10/12/2020 at 8:49 PM     Finalized by (CST): Joice Curling, MD on 10/12/2020 at 8:58 PM           Assessment and Plan:    1.  Colon cancer - metastatic to lung and liver  On FOLFIRI  No treatment while in-patient   Further t

## 2020-10-15 NOTE — PROGRESS NOTES
[facility] Hospitalist Progress Note                                                                   Michael Hernandez [birth year]  9/2/1950    SUBJECTIVE:  Pt seen and examined.   States his abd cramping is 48*  --  43*  --   --   --   --    ALB 3.1*   < > 2.2*  2.2* 2.1* 2.3* 2.2* 2.1*    < > = values in this interval not displayed. No results for input(s): PGLU in the last 168 hours.     Meds:   Scheduled:   • potassium chloride  40 mEq Intravenous Onc pending      # Bacteremia  -repeat blood cx ngtd  -initial blood cx pos for gram pos rods, ID and sens still pending  -continue current abx  -ID following.      # LETY--> resolved  - etiology likely urinary retention  - Levin placed for urinary retention   -

## 2020-10-15 NOTE — PROGRESS NOTES
INFECTIOUS DISEASE PROGRESS NOTE    Rosemary Head Patient Status:  Observation    1950 MRN [id number]   [facility] 4NW-A Attending Keira Casillas DO   Hosp Day # 6 PCP Viral Rectal, Once PRN  •  ondansetron HCl (ZOFRAN) injection 4 mg, 4 mg, Intravenous, Q6H PRN  •  Metoclopramide HCl (REGLAN) injection 10 mg, 10 mg, Intravenous, Q8H PRN  •  influenza vaccine (PF) (FLUZONE HD) high dose for 65 yrs & older inj 0.7ml, 0.7 mL, In 89   BUN 13   < > 15  15   < > 10 9 7   CREATSERUM 0.90   < > 1.09  1.09   < > 1.10 0.97 0.93   GFRAA 101   < > 80  80   < > 79 92 97   GFRNAA 87   < > 69  69   < > 69 80 84   CA 7.8*   < > 7.6*  7.6*   < > 7.7* 7.5* 7.8*   ALB 3.1*   < > 2.2*  2.2*   < > colitis and constipation, mild distention on exam and decreased BS  5. Stage IV rectal cancer  6. LETY, improving  7. Urinary retention, Levin placed 10/9.     PLAN:  - await final ID on blood cultures although Zosyn should cover given anaerobic GPR  - [email or web address]

## 2020-10-15 NOTE — PROGRESS NOTES
10/15/20 6746   Clinical Encounter Type   Visited With Patient   Routine Visit Introduction   Continue Visiting No  ( gave the patient a blank POLST form for review and completion.)   Patient Spiritual Encounters   Spiritual Assessment Completed

## 2020-10-15 NOTE — PHYSICAL THERAPY NOTE
PHYSICAL THERAPY TREATMENT NOTE - INPATIENT    Room Number: 431/431-A     Session: 1   Number of Visits to Meet Established Goals: 5    Presenting Problem: abdominal pain, weakness    Problem List  Principal Problem:    Abdominal pain, generalized  Active hospital room?: A Little   -   Climbing 3-5 steps with a railing?: A Little       AM-PAC Score:  Raw Score: 21   Approx Degree of Impairment: 28.97%   Standardized Score (AM-PAC Scale): 50.25   CMS Modifier (G-Code): CJ    FUNCTIONAL ABILITY STATUS  Gait A 5x/week    CURRENT GOALS   Goal #1 Patient is able to demonstrate supine - sit EOB @ level: modified independent      Goal #2 Patient is able to demonstrate transfers EOB to/from Chair/Wheelchair at assistance level: modified independent      Goal #3 Celina

## 2020-10-15 NOTE — PROGRESS NOTES
Patient has been up ambulating in hallway also in room has been sitting up in chair. Plan of care reviewed this am with him and his wife.

## 2020-10-15 NOTE — PLAN OF CARE
Problem: Impaired Activities of Daily Living  Goal: Achieve highest/safest level of independence in self care  Description: Interventions:  - Assess ability and encourage patient to participate in ADLs to maximize function  - Promote sitting position [facility]

## 2020-10-15 NOTE — PLAN OF CARE
Patient received alert and oriented, able to make needs known. Ambulatory to the bathroom with stby assistance. One bowel movement this shift, unable to collect as patient missed collection bowl. Rested uneventfully throughout the night otherwise.     No

## 2020-10-15 NOTE — PROGRESS NOTES
[facility]    Nephrology Progress Note    David Gilliland Attending:  Ridge Andersen DO       SUBJECTIVE:     No leg swelling  Tolerating angel  Po intake poor   diarrhea    PHYSICAL EXAM:     Vital Signs: /72 (BP Location: Left arm)   Puls 1.19 10/14/2020    EOSABS 0.14 10/14/2020    BASABS 0.00 10/14/2020    NEUT 72 10/14/2020    LYMPH 17 10/14/2020    MON 3 10/14/2020    EOS 2 10/14/2020    BASO 0 10/14/2020    NEPERCENT 70.7 10/15/2020    LYPERCENT 14.5 10/15/2020    MOPERCENT 7.2 10/15/2 (MIRALAX) powder packet 17 g, 17 g, Oral, Daily PRN    •  magnesium hydroxide (MILK OF MAGNESIA) 400 MG/5ML suspension 30 mL, 30 mL, Oral, Daily PRN    •  bisacodyl (DULCOLAX) rectal suppository 10 mg, 10 mg, Rectal, Daily PRN    •  Fleet Enema (FLEET) 7-1 [id number] [address], [address]

## 2020-10-16 VITALS
WEIGHT: 140 LBS | DIASTOLIC BLOOD PRESSURE: 82 MMHG | HEART RATE: 75 BPM | SYSTOLIC BLOOD PRESSURE: 121 MMHG | RESPIRATION RATE: 18 BRPM | TEMPERATURE: 98 F | OXYGEN SATURATION: 92 % | BODY MASS INDEX: 21.22 KG/M2 | HEIGHT: 68 IN

## 2020-10-16 PROCEDURE — 83735 ASSAY OF MAGNESIUM: CPT | Performed by: INTERNAL MEDICINE

## 2020-10-16 PROCEDURE — 97116 GAIT TRAINING THERAPY: CPT

## 2020-10-16 PROCEDURE — 97110 THERAPEUTIC EXERCISES: CPT

## 2020-10-16 PROCEDURE — 85007 BL SMEAR W/DIFF WBC COUNT: CPT | Performed by: INTERNAL MEDICINE

## 2020-10-16 PROCEDURE — 80069 RENAL FUNCTION PANEL: CPT | Performed by: INTERNAL MEDICINE

## 2020-10-16 PROCEDURE — 85027 COMPLETE CBC AUTOMATED: CPT | Performed by: INTERNAL MEDICINE

## 2020-10-16 PROCEDURE — 97530 THERAPEUTIC ACTIVITIES: CPT

## 2020-10-16 PROCEDURE — 85025 COMPLETE CBC W/AUTO DIFF WBC: CPT | Performed by: INTERNAL MEDICINE

## 2020-10-16 RX ORDER — DICYCLOMINE HYDROCHLORIDE 10 MG/1
10 CAPSULE ORAL
Qty: 30 CAPSULE | Refills: 0 | Status: SHIPPED | OUTPATIENT
Start: 2020-10-16 | End: 2020-10-16

## 2020-10-16 RX ORDER — SUCRALFATE ORAL 1 G/10ML
1 SUSPENSION ORAL
Qty: 1 BOTTLE | Refills: 0 | Status: SHIPPED | OUTPATIENT
Start: 2020-10-16 | End: 2020-10-16

## 2020-10-16 RX ORDER — SODIUM CHLORIDE 1000 MG
1 TABLET, SOLUBLE MISCELLANEOUS DAILY
Qty: 30 TABLET | Refills: 0 | Status: SHIPPED | OUTPATIENT
Start: 2020-10-17

## 2020-10-16 RX ORDER — POTASSIUM CHLORIDE 29.8 MG/ML
40 INJECTION INTRAVENOUS ONCE
Status: COMPLETED | OUTPATIENT
Start: 2020-10-16 | End: 2020-10-16

## 2020-10-16 RX ORDER — DICYCLOMINE HYDROCHLORIDE 10 MG/1
10 CAPSULE ORAL 3 TIMES DAILY PRN
Qty: 30 CAPSULE | Refills: 0 | Status: SHIPPED | OUTPATIENT
Start: 2020-10-16

## 2020-10-16 RX ORDER — POTASSIUM CHLORIDE 20 MEQ/1
20 TABLET, EXTENDED RELEASE ORAL 2 TIMES DAILY
Qty: 30 TABLET | Refills: 0 | Status: SHIPPED | OUTPATIENT
Start: 2020-10-16

## 2020-10-16 RX ORDER — METRONIDAZOLE 500 MG/1
500 TABLET ORAL 3 TIMES DAILY
Qty: 42 TABLET | Refills: 0 | Status: ON HOLD | OUTPATIENT
Start: 2020-10-16 | End: 2020-11-09

## 2020-10-16 RX ORDER — SIMETHICONE 80 MG
80 TABLET,CHEWABLE ORAL
Qty: 30 TABLET | Refills: 0 | Status: SHIPPED | COMMUNITY
Start: 2020-10-16

## 2020-10-16 RX ORDER — MAGNESIUM OXIDE 400 MG (241.3 MG MAGNESIUM) TABLET
400 TABLET ONCE
Status: DISCONTINUED | OUTPATIENT
Start: 2020-10-16 | End: 2020-10-16

## 2020-10-16 NOTE — CM/SW NOTE
10/16/20 1500   Discharge disposition   Expected discharge disposition Home or Self   Name of Facillity/Home Care/Hospice Residential   [facility] aware pt will dc home today

## 2020-10-16 NOTE — PHYSICAL THERAPY NOTE
PHYSICAL THERAPY TREATMENT NOTE - INPATIENT    Room Number: 431/431-A     Session: 4   Number of Visits to Meet Established Goals: 5    Presenting Problem: abdominal pain, weakness    Problem List  Principal Problem:    Abdominal pain, generalized  Active AM-PAC Score:  Raw Score: 24   Approx Degree of Impairment: 0%   Standardized Score (AM-PAC Scale): 61.14   CMS Modifier (G-Code): CH    FUNCTIONAL ABILITY STATUS  Gait Assessment   Gait Assistance: Modified independent  Distance (ft): 200  Assistive demonstrate transfers EOB to/from Chair/Wheelchair at assistance level: modified independent      Goal #3 Patient is able to ambulate 250 feet with assist device: walker - rolling at assistance level: supervision      Goal #4 Pt will negotiate one flight o

## 2020-10-16 NOTE — PROGRESS NOTES
[facility]    Nephrology Progress Note    Luis E Gonzales Attending:  Liz Mott DO       SUBJECTIVE:     No leg swelling  Po intake poor   diarrhea    PHYSICAL EXAM:     Vital Signs: /82 (BP Location: Left arm)   Pulse 75   Temp 97.8 ° LYMPHABS 1.50 10/16/2020    EOSABS 0.00 10/16/2020    BASABS 0.00 10/16/2020    NEUT 75 10/16/2020    LYMPH 13 10/16/2020    MON 7 10/16/2020    EOS 0 10/16/2020    BASO 0 10/16/2020    NEPERCENT 70.7 10/15/2020    LYPERCENT 14.5 10/15/2020    MOPERCENT 7. mg, Oral, QAM AC    •  Enoxaparin Sodium (LOVENOX) 40 MG/0.4ML injection 40 mg, 40 mg, Subcutaneous, Daily    •  acetaminophen (TYLENOL) tab 650 mg, 650 mg, Oral, Q6H PRN    •  docusate sodium (COLACE) cap 100 mg, 100 mg, Oral, BID    •  PEG 3350 (MIRALAX) retention  -- angel removed    HTN  -- ok to resume lisinopril on discharge     If discharging discharge on NaCl 1g/d and kcl 40meq daily.    Repeat BMP and Mg on Monday with results to Dr. Cristiane Lagunas      Thank you for allowing me to participate in the care o

## 2020-10-16 NOTE — PLAN OF CARE
Dr Marybel Kapoor came to see patient earlier this shift but patient wasn't available. Patient told Dr Amanda Vergara that he wants to go home today. Dr Madonna Hurtado cleared patient. Dr Erika Cerna said okay for discharge but patient will need f/up labs & patient will n

## 2020-10-16 NOTE — PROGRESS NOTES
[facility] Hospitalist Progress Note                                                                   Michael Hernandez [birth year]  9/2/1950    SUBJECTIVE:  Pt seen and examined.   States his abd cramping is in the last 168 hours.     Meds:   Scheduled:   • sodium chloride  1 g Oral Daily   • simethicone  80 mg Oral TID CC and HS   • sucralfate  1 g Oral TID AC and HS   • dicyclomine  10 mg Oral TID AC&HS   • piperacillin-tazobactam  3.375 g Intravenous Q8H   • be do to hypotension/atn  - renal following, follow rec    # Hypotension--> improved  - etiology uncertain  - possibly secondary to infection --> ID, IV abx, blood cx 2/2 pos gram pos maria e, repeat blood cx ordered and ngtd  - possible secondary to dehydrati

## 2020-10-16 NOTE — PLAN OF CARE
Patient received alert and oriented, no complaints of pain this shift. He ambulated to the bathroom throughout the night, voided with no difficulties.  Fluids encouraged    Problem: PAIN - ADULT  Goal: Verbalizes/displays adequate comfort level or patien

## 2020-10-16 NOTE — PROGRESS NOTES
[facility]  Progress Note    Missy Long Patient Status:  Inpatient    1950 MRN [id number]   [facility] 4NW-A Attending Ronny Andres, DO   Hosp Day # 7 PCP Bryce Baird MD     Subjective:  Missy Long is a(n) 6 ALKPHO 87  --   --   --   --   --    AST 43*  --   --   --   --   --    ALT 27  --   --   --   --   --    BILT 0.4  --   --   --   --   --    TP 5.2*  --   --   --   --   --     < > = values in this interval not displayed.            Imaging:    No result

## 2020-10-16 NOTE — PROGRESS NOTES
INFECTIOUS DISEASE PROGRESS NOTE    Júnior Lorenzo Patient Status:  Observation    1950 MRN [id number]   [facility] 4NW-A Attending Ziggy Padilla DO   Hosp Day # 7 PCP Viral [facility]) injection 4 mg, 4 mg, Intravenous, Q6H PRN  •  Metoclopramide HCl (REGLAN) injection 10 mg, 10 mg, Intravenous, Q8H PRN  •  influenza vaccine (PF) (FLUZONE HD) high dose for 65 yrs & older inj 0.7ml, 0.7 mL, Intramuscular, Prior to discharge    Re CA 7.6*  7.6*   < > 7.5* 7.8*  --  8.0*   ALB 2.2*  2.2*   < > 2.2* 2.1*  --  2.3*     138   < > 133* 136  --  133*   K 3.5  3.5   < > 2.9* 3.2*  3.1* 4.2 3.3*     110   < > 103 104  --  100   CO2 25.0  25.0   < > 25.0 26.0  --  29.0   ALKPHO retention, Levin placed 10/9. PLAN:  - continue empiric zosyn  - empiric po vanco for now but may d/c soon given c.  Diff negative and diarrhea improving  - follow abd exam  - follow temps and WBC  - GI panel pending  - will plan to transition to po abx

## 2020-10-17 NOTE — DISCHARGE SUMMARY
General Medicine Discharge Summary     Patient ID:  Luis E Gonzales  78year old  9/2/1950    Admit date: 10/7/2020    Discharge date and time: 10/16/2020  8:30 PM     Attending Physician: Claude Rodriguez repeat stool studies pending , c-diff neg     # Bacteremia  -repeat blood cx ngtd  -initial blood cx pos for gram pos rods, ID and sens still pending  -continue current abx  -ID following.      # LETY--> resolved  - etiology likely urinary retention  - Fole R-0    metRONIDAZOLE 500 MG Oral Tab  Take 1 tablet (500 mg total) by mouth 3 (three) times daily for 14 days. , Print Script, Disp-42 tablet, R-0    sodium chloride 1 g Oral Tab  Take 1 tablet (1 g total) by mouth daily. , Normal, Disp-30 tablet, R-0    Pot

## 2020-10-20 ENCOUNTER — LAB REQUISITION (OUTPATIENT)
Dept: LAB | Facility: HOSPITAL | Age: 70
End: 2020-10-20
Payer: COMMERCIAL

## 2020-10-20 DIAGNOSIS — D20.1 BENIGN NEOPLASM OF SOFT TISSUE OF PERITONEUM: ICD-10-CM

## 2020-10-20 PROCEDURE — 80048 BASIC METABOLIC PNL TOTAL CA: CPT | Performed by: FAMILY MEDICINE

## 2020-10-20 PROCEDURE — 85025 COMPLETE CBC W/AUTO DIFF WBC: CPT | Performed by: FAMILY MEDICINE

## 2020-10-21 ENCOUNTER — APPOINTMENT (OUTPATIENT)
Dept: CT IMAGING | Facility: HOSPITAL | Age: 70
DRG: 853 | End: 2020-10-21
Attending: EMERGENCY MEDICINE
Payer: COMMERCIAL

## 2020-10-21 ENCOUNTER — HOSPITAL ENCOUNTER (INPATIENT)
Facility: HOSPITAL | Age: 70
LOS: 19 days | Discharge: HOSPICE | DRG: 853 | End: 2020-11-10
Attending: EMERGENCY MEDICINE | Admitting: HOSPITALIST
Payer: COMMERCIAL

## 2020-10-21 DIAGNOSIS — E86.0 DEHYDRATION: ICD-10-CM

## 2020-10-21 DIAGNOSIS — K35.80 ACUTE APPENDICITIS, UNSPECIFIED ACUTE APPENDICITIS TYPE: ICD-10-CM

## 2020-10-21 DIAGNOSIS — K37 APPENDICITIS: ICD-10-CM

## 2020-10-21 DIAGNOSIS — N17.9 AKI (ACUTE KIDNEY INJURY) (HCC): Primary | ICD-10-CM

## 2020-10-21 PROCEDURE — 74177 CT ABD & PELVIS W/CONTRAST: CPT | Performed by: EMERGENCY MEDICINE

## 2020-10-22 ENCOUNTER — ANESTHESIA (OUTPATIENT)
Dept: SURGERY | Facility: HOSPITAL | Age: 70
DRG: 853 | End: 2020-10-22
Payer: COMMERCIAL

## 2020-10-22 ENCOUNTER — ANESTHESIA EVENT (OUTPATIENT)
Dept: SURGERY | Facility: HOSPITAL | Age: 70
DRG: 853 | End: 2020-10-22
Payer: COMMERCIAL

## 2020-10-22 PROBLEM — N17.9 AKI (ACUTE KIDNEY INJURY) (HCC): Status: ACTIVE | Noted: 2020-10-22

## 2020-10-22 PROBLEM — E86.0 DEHYDRATION: Status: ACTIVE | Noted: 2020-10-22

## 2020-10-22 PROBLEM — K35.80 ACUTE APPENDICITIS, UNSPECIFIED ACUTE APPENDICITIS TYPE: Status: ACTIVE | Noted: 2020-10-22

## 2020-10-22 PROCEDURE — 0DTH0ZZ RESECTION OF CECUM, OPEN APPROACH: ICD-10-PCS | Performed by: SURGERY

## 2020-10-22 PROCEDURE — 0DJD4ZZ INSPECTION OF LOWER INTESTINAL TRACT, PERCUTANEOUS ENDOSCOPIC APPROACH: ICD-10-PCS | Performed by: SURGERY

## 2020-10-22 PROCEDURE — 0D1B0Z4 BYPASS ILEUM TO CUTANEOUS, OPEN APPROACH: ICD-10-PCS | Performed by: SURGERY

## 2020-10-22 RX ORDER — ENOXAPARIN SODIUM 100 MG/ML
40 INJECTION SUBCUTANEOUS NIGHTLY
Status: DISCONTINUED | OUTPATIENT
Start: 2020-10-22 | End: 2020-11-10

## 2020-10-22 RX ORDER — DEXAMETHASONE SODIUM PHOSPHATE 4 MG/ML
VIAL (ML) INJECTION AS NEEDED
Status: DISCONTINUED | OUTPATIENT
Start: 2020-10-22 | End: 2020-10-22 | Stop reason: SURG

## 2020-10-22 RX ORDER — SODIUM CHLORIDE, SODIUM LACTATE, POTASSIUM CHLORIDE, CALCIUM CHLORIDE 600; 310; 30; 20 MG/100ML; MG/100ML; MG/100ML; MG/100ML
INJECTION, SOLUTION INTRAVENOUS CONTINUOUS
Status: DISCONTINUED | OUTPATIENT
Start: 2020-10-22 | End: 2020-10-24

## 2020-10-22 RX ORDER — CEFOXITIN 2 G/1
INJECTION, POWDER, FOR SOLUTION INTRAVENOUS AS NEEDED
Status: DISCONTINUED | OUTPATIENT
Start: 2020-10-22 | End: 2020-10-22 | Stop reason: SURG

## 2020-10-22 RX ORDER — HYDROMORPHONE HYDROCHLORIDE 1 MG/ML
0.4 INJECTION, SOLUTION INTRAMUSCULAR; INTRAVENOUS; SUBCUTANEOUS EVERY 5 MIN PRN
Status: DISCONTINUED | OUTPATIENT
Start: 2020-10-22 | End: 2020-10-22 | Stop reason: HOSPADM

## 2020-10-22 RX ORDER — METOCLOPRAMIDE HYDROCHLORIDE 5 MG/ML
10 INJECTION INTRAMUSCULAR; INTRAVENOUS AS NEEDED
Status: DISCONTINUED | OUTPATIENT
Start: 2020-10-22 | End: 2020-10-22 | Stop reason: HOSPADM

## 2020-10-22 RX ORDER — HYDROMORPHONE HYDROCHLORIDE 1 MG/ML
INJECTION, SOLUTION INTRAMUSCULAR; INTRAVENOUS; SUBCUTANEOUS
Status: COMPLETED
Start: 2020-10-22 | End: 2020-10-22

## 2020-10-22 RX ORDER — ONDANSETRON 2 MG/ML
4 INJECTION INTRAMUSCULAR; INTRAVENOUS EVERY 6 HOURS PRN
Status: DISCONTINUED | OUTPATIENT
Start: 2020-10-22 | End: 2020-10-27

## 2020-10-22 RX ORDER — METOCLOPRAMIDE HYDROCHLORIDE 5 MG/ML
INJECTION INTRAMUSCULAR; INTRAVENOUS
Status: COMPLETED
Start: 2020-10-22 | End: 2020-10-22

## 2020-10-22 RX ORDER — HYDROMORPHONE HYDROCHLORIDE 1 MG/ML
0.8 INJECTION, SOLUTION INTRAMUSCULAR; INTRAVENOUS; SUBCUTANEOUS EVERY 2 HOUR PRN
Status: DISCONTINUED | OUTPATIENT
Start: 2020-10-22 | End: 2020-11-10

## 2020-10-22 RX ORDER — ROCURONIUM BROMIDE 10 MG/ML
INJECTION, SOLUTION INTRAVENOUS AS NEEDED
Status: DISCONTINUED | OUTPATIENT
Start: 2020-10-22 | End: 2020-10-22 | Stop reason: SURG

## 2020-10-22 RX ORDER — LABETALOL HYDROCHLORIDE 5 MG/ML
10 INJECTION, SOLUTION INTRAVENOUS EVERY 6 HOURS PRN
Status: DISCONTINUED | OUTPATIENT
Start: 2020-10-22 | End: 2020-11-10

## 2020-10-22 RX ORDER — HYDROCODONE BITARTRATE AND ACETAMINOPHEN 5; 325 MG/1; MG/1
2 TABLET ORAL AS NEEDED
Status: DISCONTINUED | OUTPATIENT
Start: 2020-10-22 | End: 2020-10-22 | Stop reason: HOSPADM

## 2020-10-22 RX ORDER — NEOSTIGMINE METHYLSULFATE 1 MG/ML
INJECTION INTRAVENOUS AS NEEDED
Status: DISCONTINUED | OUTPATIENT
Start: 2020-10-22 | End: 2020-10-22 | Stop reason: SURG

## 2020-10-22 RX ORDER — FLUCONAZOLE 2 MG/ML
200 INJECTION, SOLUTION INTRAVENOUS EVERY 24 HOURS
Status: DISCONTINUED | OUTPATIENT
Start: 2020-10-22 | End: 2020-10-26

## 2020-10-22 RX ORDER — HYDROCODONE BITARTRATE AND ACETAMINOPHEN 5; 325 MG/1; MG/1
1 TABLET ORAL AS NEEDED
Status: DISCONTINUED | OUTPATIENT
Start: 2020-10-22 | End: 2020-10-22 | Stop reason: HOSPADM

## 2020-10-22 RX ORDER — HYDROMORPHONE HYDROCHLORIDE 1 MG/ML
0.4 INJECTION, SOLUTION INTRAMUSCULAR; INTRAVENOUS; SUBCUTANEOUS EVERY 2 HOUR PRN
Status: DISCONTINUED | OUTPATIENT
Start: 2020-10-22 | End: 2020-11-10

## 2020-10-22 RX ORDER — ACETAMINOPHEN 500 MG
1000 TABLET ORAL ONCE AS NEEDED
Status: DISCONTINUED | OUTPATIENT
Start: 2020-10-22 | End: 2020-10-22 | Stop reason: HOSPADM

## 2020-10-22 RX ORDER — LIDOCAINE HYDROCHLORIDE 10 MG/ML
INJECTION, SOLUTION INFILTRATION; PERINEURAL AS NEEDED
Status: DISCONTINUED | OUTPATIENT
Start: 2020-10-22 | End: 2020-10-22 | Stop reason: HOSPADM

## 2020-10-22 RX ORDER — HYDROMORPHONE HYDROCHLORIDE 1 MG/ML
0.2 INJECTION, SOLUTION INTRAMUSCULAR; INTRAVENOUS; SUBCUTANEOUS EVERY 2 HOUR PRN
Status: DISCONTINUED | OUTPATIENT
Start: 2020-10-22 | End: 2020-11-10

## 2020-10-22 RX ORDER — FAMOTIDINE 10 MG/ML
20 INJECTION, SOLUTION INTRAVENOUS NIGHTLY
Status: DISCONTINUED | OUTPATIENT
Start: 2020-10-22 | End: 2020-10-27

## 2020-10-22 RX ORDER — SODIUM CHLORIDE 9 MG/ML
INJECTION, SOLUTION INTRAVENOUS CONTINUOUS
Status: DISCONTINUED | OUTPATIENT
Start: 2020-10-22 | End: 2020-10-22

## 2020-10-22 RX ORDER — GLYCOPYRROLATE 0.2 MG/ML
INJECTION, SOLUTION INTRAMUSCULAR; INTRAVENOUS AS NEEDED
Status: DISCONTINUED | OUTPATIENT
Start: 2020-10-22 | End: 2020-10-22 | Stop reason: SURG

## 2020-10-22 RX ORDER — FAMOTIDINE 20 MG/1
20 TABLET ORAL NIGHTLY
Status: DISCONTINUED | OUTPATIENT
Start: 2020-10-22 | End: 2020-10-27

## 2020-10-22 RX ORDER — PHENYLEPHRINE HCL 10 MG/ML
VIAL (ML) INJECTION AS NEEDED
Status: DISCONTINUED | OUTPATIENT
Start: 2020-10-22 | End: 2020-10-22 | Stop reason: SURG

## 2020-10-22 RX ORDER — BUPIVACAINE HYDROCHLORIDE 5 MG/ML
INJECTION, SOLUTION EPIDURAL; INTRACAUDAL AS NEEDED
Status: DISCONTINUED | OUTPATIENT
Start: 2020-10-22 | End: 2020-10-22 | Stop reason: HOSPADM

## 2020-10-22 RX ORDER — ONDANSETRON 2 MG/ML
4 INJECTION INTRAMUSCULAR; INTRAVENOUS AS NEEDED
Status: DISCONTINUED | OUTPATIENT
Start: 2020-10-22 | End: 2020-10-22 | Stop reason: HOSPADM

## 2020-10-22 RX ORDER — SODIUM CHLORIDE, SODIUM LACTATE, POTASSIUM CHLORIDE, CALCIUM CHLORIDE 600; 310; 30; 20 MG/100ML; MG/100ML; MG/100ML; MG/100ML
INJECTION, SOLUTION INTRAVENOUS CONTINUOUS
Status: DISCONTINUED | OUTPATIENT
Start: 2020-10-22 | End: 2020-10-22 | Stop reason: HOSPADM

## 2020-10-22 RX ORDER — NALOXONE HYDROCHLORIDE 0.4 MG/ML
80 INJECTION, SOLUTION INTRAMUSCULAR; INTRAVENOUS; SUBCUTANEOUS AS NEEDED
Status: DISCONTINUED | OUTPATIENT
Start: 2020-10-22 | End: 2020-10-22 | Stop reason: HOSPADM

## 2020-10-22 RX ORDER — ONDANSETRON 2 MG/ML
4 INJECTION INTRAMUSCULAR; INTRAVENOUS EVERY 6 HOURS PRN
Status: DISCONTINUED | OUTPATIENT
Start: 2020-10-22 | End: 2020-10-22

## 2020-10-22 RX ORDER — ONDANSETRON 2 MG/ML
INJECTION INTRAMUSCULAR; INTRAVENOUS
Status: COMPLETED
Start: 2020-10-22 | End: 2020-10-22

## 2020-10-22 RX ADMIN — NEOSTIGMINE METHYLSULFATE 2 MG: 1 INJECTION INTRAVENOUS at 13:19:00

## 2020-10-22 RX ADMIN — PHENYLEPHRINE HCL 200 MCG: 10 MG/ML VIAL (ML) INJECTION at 11:45:00

## 2020-10-22 RX ADMIN — PHENYLEPHRINE HCL 200 MCG: 10 MG/ML VIAL (ML) INJECTION at 12:12:00

## 2020-10-22 RX ADMIN — PHENYLEPHRINE HCL 200 MCG: 10 MG/ML VIAL (ML) INJECTION at 11:47:00

## 2020-10-22 RX ADMIN — PHENYLEPHRINE HCL 200 MCG: 10 MG/ML VIAL (ML) INJECTION at 13:49:00

## 2020-10-22 RX ADMIN — CEFOXITIN 2 G: 2 INJECTION, POWDER, FOR SOLUTION INTRAVENOUS at 11:48:00

## 2020-10-22 RX ADMIN — PHENYLEPHRINE HCL 200 MCG: 10 MG/ML VIAL (ML) INJECTION at 11:42:00

## 2020-10-22 RX ADMIN — PHENYLEPHRINE HCL 200 MCG: 10 MG/ML VIAL (ML) INJECTION at 11:30:00

## 2020-10-22 RX ADMIN — ROCURONIUM BROMIDE 15 MG: 10 INJECTION, SOLUTION INTRAVENOUS at 11:41:00

## 2020-10-22 RX ADMIN — SODIUM CHLORIDE: 9 INJECTION, SOLUTION INTRAVENOUS at 13:42:00

## 2020-10-22 RX ADMIN — PHENYLEPHRINE HCL 200 MCG: 10 MG/ML VIAL (ML) INJECTION at 11:37:00

## 2020-10-22 RX ADMIN — PHENYLEPHRINE HCL 200 MCG: 10 MG/ML VIAL (ML) INJECTION at 12:18:00

## 2020-10-22 RX ADMIN — SODIUM CHLORIDE: 9 INJECTION, SOLUTION INTRAVENOUS at 11:57:00

## 2020-10-22 RX ADMIN — ROCURONIUM BROMIDE 25 MG: 10 INJECTION, SOLUTION INTRAVENOUS at 12:06:00

## 2020-10-22 RX ADMIN — DEXAMETHASONE SODIUM PHOSPHATE 4 MG: 4 MG/ML VIAL (ML) INJECTION at 11:39:00

## 2020-10-22 RX ADMIN — PHENYLEPHRINE HCL 200 MCG: 10 MG/ML VIAL (ML) INJECTION at 12:14:00

## 2020-10-22 RX ADMIN — GLYCOPYRROLATE 0.4 MG: 0.2 INJECTION, SOLUTION INTRAMUSCULAR; INTRAVENOUS at 13:19:00

## 2020-10-22 RX ADMIN — SODIUM CHLORIDE: 9 INJECTION, SOLUTION INTRAVENOUS at 12:29:00

## 2020-10-22 NOTE — ANESTHESIA POSTPROCEDURE EVALUATION
[facility] Patient Status:  Inpatient   Age/Gender 78year old male MRN [id number]   [facility] SURGERY Attending Dana Gonzalez, *   Hosp Day # 0 PCP Bryce Baird MD       Anesthesia Post-op Note    Proc

## 2020-10-22 NOTE — CONSULTS
[facility]  Report of Consultation    Horace Quintana Patient Status:  Inpatient    1950 MRN [id number]   [facility] 3NW-A Attending Олег Trent, DO   Hosp Day # 0 PCP Luci Felix MD     Reason for Consultation:  Abd HYDROmorphone HCl (DILAUDID) 1 MG/ML injection 0.8 mg, 0.8 mg, Intravenous, Q2H PRN  •  ondansetron HCl (ZOFRAN) injection 4 mg, 4 mg, Intravenous, Q6H PRN  •  Piperacillin Sod-Tazobactam So (ZOSYN) 3.375 g in dextrose 5 % 100 mL ADD-vantage, 3.375 g, Intr 10/21/2020    CA 8.5 10/21/2020    ALB 2.6 10/21/2020    ALKPHO 107 10/21/2020    BILT 0.6 10/21/2020    TP 6.1 10/21/2020    AST 27 10/21/2020    ALT 12 10/21/2020    INR 1.61 10/21/2020    PTP 19.6 10/21/2020    LIP 64 10/21/2020    TROP <0.045 10/21/202

## 2020-10-22 NOTE — PROGRESS NOTES
[facility] Note:  Renal Dose Adjustment    Geni Devries has been prescribed famotidine (PEPCID) 20 mg intravenously or orally every 12 hours. Estimated Creatinine Clearance: 44.1 mL/min (A) (based on SCr of 1.55 mg/dL (H)).     Calculated creatinine

## 2020-10-22 NOTE — ED PROVIDER NOTES
Patient Seen in: [facility] Emergency Department      History   Patient presents with:  Abdomen/Flank Pain    Stated Complaint: abdominal pain    HPI    Patient is a a 73-year-old woman with stage IV rectal cancer with metastatic lesions, recent adm atraumatic. Eyes:      General: No scleral icterus. Conjunctiva/sclera: Conjunctivae normal.   Neck:      Musculoskeletal: Normal range of motion and neck supple. Cardiovascular:      Rate and Rhythm: Normal rate.    Pulmonary:      Effort: Pulmonar All other components within normal limits   TROPONIN I - Normal   RAPID SARS-COV-2 BY PCR - Normal   CBC WITH DIFFERENTIAL WITH PLATELET    Narrative: The following orders were created for panel order CBC WITH DIFFERENTIAL WITH PLATELET.   Procedure metastatic lesions. The largest encompasses the majority of the right lobe measures 12.8 x 8.8     cm. The remaining masses appears stable. BILIARY:  Contracted gallbladder. PANCREAS:  No lesion, fluid collection, ductal dilatation, or atrophy. PELVIC NODES:  No adenopathy. PELVIC ORGANS:  No visible mass. Pelvic organs appropriate for patient     age. BONES:  Bilateral L5 spondylolysis, grade 1 anterior spondylolisthesis. Marked degenerative disc disease L5-S1.   No fracture o reviewed by myself. If available, previous medical record was reviewed for the patient. Tracing on cardiac monitor and pulse oximetry was reviewed by myself. Patient was given fluids and pain control.   He does have a increasing leukocytosis with fin

## 2020-10-22 NOTE — BH PROGRESS NOTE
Went to see the pt to find out he is down for surgery. Will check on him tomorrow to f/u with the phq9.

## 2020-10-22 NOTE — ED INITIAL ASSESSMENT (HPI)
Pt reports right sided abd pain. Hx of colitis. Denies n/v. Had diarrhea, immodium helping.  Chemo pt

## 2020-10-22 NOTE — PLAN OF CARE
NURSING ADMISSION NOTE      Patient admitted via Cart  Oriented to room. Safety precautions initiated. Bed in low position. Call light in reach. Pt oriented and alert on assessment. Reporting 6/10 pain to abdomen.  Pt declining pain medications at

## 2020-10-22 NOTE — ED NOTES
Pt A&O x4, resting on stretcher, no complaints at this time. VSS. Pt belongings placed in belongings bag and on stretcher. Pt updated with room assignment. Report given to [facility] ER RN.

## 2020-10-22 NOTE — ANESTHESIA PREPROCEDURE EVALUATION
PRE-OP EVALUATION    Patient Name: Bro Potts    Pre-op Diagnosis: Appendicitis Aljuany Maza    Procedure(s):  LAPAROSCOPIC APPENDECTOMY    Surgeon(s) and Role:     Samy Monaco MD - Primary    Pre-op vitals reviewed.   Temp: 97.9 °F (36.6 °C)  Pulse: 87 Take 1 tablet (20 mEq total) by mouth 2 (two) times daily. , Disp: 30 tablet, Rfl: 0    •  Pantoprazole Sodium 40 MG Oral Tab EC, Take 40 mg by mouth daily. , Disp: , Rfl:     •  gabapentin 300 MG Oral Cap, Take 1 capsule (300 mg total) by mouth 3 (three) ti (L) 09/28/2020    K 4.7 10/22/2020    K 4.71 09/28/2020    CL 98 10/22/2020    CL 91 (L) 09/28/2020    CO2 24.0 10/22/2020    CO2 26.3 09/28/2020    BUN 17 10/22/2020    BUN 14.0 09/28/2020    CREATSERUM 1.55 (H) 10/22/2020    CREATSERUM 1.11 09/28/2020

## 2020-10-22 NOTE — ED NOTES
Pts sats dropped to 88%. Placed on 2L with minimal improvement, increased to 4L, pt satting at 94%. A&Ox4, resting on stretcher.

## 2020-10-22 NOTE — OCCUPATIONAL THERAPY NOTE
Orders received and chart reviewed. Per ENZO Martel, patient going to OR at 11:00 today. Will hold OT evaluation today and continue to follow. Thank you.

## 2020-10-22 NOTE — PLAN OF CARE
Assume care in the afternoon, s/p Lap w/ sbr. Patient drowsy but follows commands. NG tube to LIS. New ileostomy intact. CARMITA drain intact. Patient and family aware about plan of care. NPO until cleared by surgery. I and O monitored, angel intact.  All consul

## 2020-10-22 NOTE — OPERATIVE REPORT
[address]                                                         Operative Note    Ronold Foot Location: [address] Perioperative   CSN [id number] MRN [id number]   Admission Date 10/21/2020 Procedure Date 10/22/2020   Attending Physician Dann Santillan ileum.  The appendix had perforated at its base with dense thickened changes involving the cecum. I elected to do an ileocecectomy. Terminal ileum was divided with the PAUL stapling device.   LigaSure was used to ligate the mesenteric vasculature supplying

## 2020-10-22 NOTE — HOME CARE LIAISON
Ptnt current with [facility] for sn/pt  Will need a BROOKE order on or before dc    Thanks  Igor Lim

## 2020-10-22 NOTE — PROGRESS NOTES
Consent signed for jo luis.  POA forms and 5 wishes form made copies of and placed on the chart.   1000; patient down to surgery in stable condition

## 2020-10-22 NOTE — H&P
[facility] Hospitalist H&P       CC: abdominal pain    PCP: Edgardo Guerrier MD    History of Present Illness: Pt is a 79 yo with mmp including but not limited to stage IV rectal cancer, HTN, recent admission for typhilitis is admitted for worsening abd Hx  Social History    Tobacco Use      Smoking status: Never Smoker      Smokeless tobacco: Never Used    Alcohol use: No       Fam Hx  Family History   Problem Relation Age of Onset   • Other (Other) Maternal Grandfather         tb   • Cancer Maternal Gra AST  --  27   ALB 2.3* 2.6*       Recent Labs   Lab 10/21/20  2138   TROP <0.045       Additional Diagnostics: ECG: NSR HR 88 reviewed personally             Ct Abdomen Pelvis Iv Contrast, No Oral (er)    Result Date: 10/21/2020  PROCEDURE           CONC Further recommendations pending patient's clinical course.   [facility] hospitalist to continue to follow patient while in house    Patient and/or patient's family given opportunity to ask questions and note understanding and agreeing with therapeutic plan as

## 2020-10-22 NOTE — CM/SW NOTE
sw notified that pt is current with [facility] for RN/PT. BROOKE orders obtained. SW to follow for further dc planning needs.

## 2020-10-22 NOTE — CONSULTS
[facility] Patient Status:  Inpatient    1950 MRN [id number]   [facility] 4SW-A Attending Andres Anglin, *   Hosp Day # 0 PCP Taya Valles MD     Date of Admission: 10/21/2020  Admission Diagnosis metRONIDAZOLE 500 MG Oral Tab, Take 1 tablet (500 mg total) by mouth 3 (three) times daily for 14 days. , Disp: 42 tablet, Rfl: 0    •  sodium chloride 1 g Oral Tab, Take 1 tablet (1 g total) by mouth daily. , Disp: 30 tablet, Rfl: 0    •  Potassium Chloride epistaxis, sinus congestion  Cardiovascular: denies chest pain, PND, palpitations, edema, orthopnea, or syncope  Respiratory: denies SOB, dyspnea on exertion, denies cough, hemoptysis, wheezing, pleurisy  GI: denies nausea, vomiting, diarrhea, constipation Component Value Date    WBC 18.7 10/22/2020    RBC 3.13 10/22/2020    HGB 9.7 10/22/2020    HCT 28.3 10/22/2020    MCV 90.4 10/22/2020    MCH 31.0 10/22/2020    MCHC 34.3 10/22/2020    RDW 15.8 10/22/2020    .0 10/22/2020     Lab Results   Compone

## 2020-10-22 NOTE — BRIEF OP NOTE
Pre-Operative Diagnosis: Appendicitis [K37]     Post-Operative Diagnosis: Appendicitis [K37]      Procedure Performed:   Procedure(s):  DIAGNOSTIC LAPAROSCOPY EXPLORATORY LAPAROTOMY WITH CECETOMY, CREATION OF ILLEOSTOMY    Surgeon(s) and Role:     Michelle Calvin,

## 2020-10-22 NOTE — PHYSICAL THERAPY NOTE
PT consult received per Functional Mobility Score. Pt is going to OR today. Request new PT orders post-op.

## 2020-10-22 NOTE — ANESTHESIA PROCEDURE NOTES
Airway  Date/Time: 10/22/2020 11:35 AM  Urgency: elective    Airway not difficult    General Information and Staff    Patient location during procedure: OR  Anesthesiologist: Farooq Bass MD  Performed: anesthesiologist     Indications and Patient Con

## 2020-10-23 RX ORDER — LORAZEPAM 2 MG/ML
1 INJECTION INTRAMUSCULAR EVERY 6 HOURS PRN
Status: DISCONTINUED | OUTPATIENT
Start: 2020-10-23 | End: 2020-10-24

## 2020-10-23 RX ORDER — LORAZEPAM 2 MG/ML
0.5 INJECTION INTRAMUSCULAR EVERY 6 HOURS PRN
Status: DISCONTINUED | OUTPATIENT
Start: 2020-10-23 | End: 2020-10-24

## 2020-10-23 RX ORDER — ALBUMIN, HUMAN INJ 5% 5 %
250 SOLUTION INTRAVENOUS ONCE
Status: COMPLETED | OUTPATIENT
Start: 2020-10-23 | End: 2020-10-23

## 2020-10-23 RX ORDER — HALOPERIDOL 5 MG/ML
1 INJECTION INTRAMUSCULAR EVERY 4 HOURS PRN
Status: DISCONTINUED | OUTPATIENT
Start: 2020-10-23 | End: 2020-10-27

## 2020-10-23 NOTE — DIETARY NOTE
[facility] [address] [facility]    Pt does not meet malnutrition criteria at this time.     NUTRITION DIAGNOSIS/PROBLEM:    Inadequate energy intake related to decreased ability to consume sufficient energy intake and physiologic causes incr FINDINGS:  Unable to conduct nutrition focused physical exam at this time.      NUTRITION PRESCRIPTION: 69 kg  Calories: 0276-4916 calories/day (30-35 calories per kg)  Protein: 83-90 grams protein/day (1.2-1.3 grams protein per kg)  Fluid: ~1 ml/kcal or pe

## 2020-10-23 NOTE — CONSULTS
INFECTIOUS DISEASE CONSULT NOTE    Missy Shawin Patient Status:  Inpatient    1950 MRN [id number]   [facility] 4SW-A Attending Dana Gonzalez, *   Hosp Day # 1 PCP Viral He has never used smokeless tobacco. He reports that he does not drink alcohol or use drugs.     Allergies:    Morphine                NAUSEA AND VOMITING    Medications:    Current Facility-Administered Medications:   •  HYDROmorphone HCl (DILAUDID) 1 MG/M Exam:    General: No acute distress. Alert and oriented x 3. Vital signs: Blood pressure 131/86, pulse 75, temperature 99.2 °F (37.3 °C), temperature source Temporal, resp. rate 18, height 5' 8\" (1.727 m), weight 151 lb 14.4 oz (68.9 kg), SpO2 95 %.   BECKY ANA None Seen        Microbiology    Reviewed in EMR,   Hospital Encounter on 10/21/20   1. AEROBIC BACTERIAL CULTURE     Status: None (Preliminary result)    Collection Time: 10/22/20 11:56 AM    Specimen: Abdominal fluid;  Body fluid, unspecified   Re enlargement. AORTA/VASCULAR:  No aneurysm or dissection. The celiac artery, SMA, ZACH are patent. Mild ectasia of the common iliac arteries unchanged. RETROPERITONEUM:  No mass or adenopathy.   BOWEL/MESENTERY:  There is a 2.2 x 2 cm diverticulum at the l changed on the right. OTHER:  Negative. CONCLUSION:  1. Marked increase in the amount of ascites. 2. There is again noted to wall thickening involving the left colon to the rectum.   There is now marked increased wall thickening in the region of

## 2020-10-23 NOTE — PLAN OF CARE
Assume care in am. Patient drowsy but oriented. Pain controlled by dilaudid, minimal ng output. NPo except icechips, tolerated. I and O monitored. Ostomy intact. Bowel sound hypo but present. Quinonez intact. Vital signs stable.  Continue to monitor patient pr

## 2020-10-23 NOTE — PROGRESS NOTES
[facility]  Progress Note    Geni Devries Patient Status:  Inpatient    1950 MRN [id number]   [facility] 4SW-A Attending Awilda Andre, *   Hosp Day # 1 PCP Zion Berumen MD     Subjective:  Comfortable, feeling s

## 2020-10-23 NOTE — PROGRESS NOTES
[facility] Hospitalist Progress Note     PCP: Amara Price MD    CC:  Follow up    SUBJECTIVE:  Pt sitting up in bed, NG tube in place. S/p ex lap with cecetomy with ileostomy formation. Pain manageable.  No n/v/f/c.  +U, +flatus    OBJECTIVE:  Temp:  [9 PGLU 148* 155* 137* 123*       Recent Labs   Lab 10/21/20  2138   TROP <0.045          Meds:     • meropenem  500 mg Intravenous Q8H   • enoxaparin  40 mg Subcutaneous Nightly   • famoTIDine  20 mg Oral Nightly    Or   • famoTIDine  20 mg Intravenous Nig

## 2020-10-23 NOTE — PLAN OF CARE
Received last night at 1930H alert and oriented x 4, O2 at 4L/NC, no SOB noted.  NGT to LIS draining to greenish thick drainage moderate in amount, mid abdominal dressing intact, no bleeding noted, right ileostomy to ileostomy bag, ostomy is pinkish and emily

## 2020-10-23 NOTE — PROGRESS NOTES
[facility] PULMONARY/CRITICAL CARE    S: Pt is doing ok, not too much pain. He had low urine output overnight but it improved with IVF.     Meds:  • enoxaparin  40 mg Subcutaneous Nightly   • famoTIDine  20 mg Oral Nightly    Or   • famoTIDine  20 mg Intravenous N 2. Appendicitis with perforation and peritonitis. s/p ex lap with ileo-cecectomy and creation of ileotomy 10/22  -per surgery   -pain control  -abx  3. LETY: improved. -IVF  -monitor urine output and labs  4.  Metastatic rectal cancer:  -follows with

## 2020-10-23 NOTE — PROGRESS NOTES
10/23/20 4827   Clinical Encounter Type   Visited With Patient and family together  (Wife and sister at bedside)   Routine Visit Introduction  (POLST form completed by POA.  Left for MD signature)   Continue Visiting No   Patient's Supportive Strategies/

## 2020-10-24 RX ORDER — DEXTROSE AND SODIUM CHLORIDE 5; .9 G/100ML; G/100ML
INJECTION, SOLUTION INTRAVENOUS CONTINUOUS
Status: DISCONTINUED | OUTPATIENT
Start: 2020-10-24 | End: 2020-11-02

## 2020-10-24 RX ORDER — SODIUM CHLORIDE 9 MG/ML
INJECTION, SOLUTION INTRAVENOUS CONTINUOUS
Status: DISCONTINUED | OUTPATIENT
Start: 2020-10-24 | End: 2020-10-24

## 2020-10-24 RX ORDER — POTASSIUM CHLORIDE 14.9 MG/ML
20 INJECTION INTRAVENOUS ONCE
Status: COMPLETED | OUTPATIENT
Start: 2020-10-24 | End: 2020-10-24

## 2020-10-24 NOTE — PROGRESS NOTES
[facility] PULMONARY/CRITICAL CARE    S: Pt is doing fine this am. He's not having too much pain. He was anxious yesterday, was given ativan, this made it worse so he was given haldol.  He's better this am.     Meds:  • potassium chloride  20 mEq Intravenous Once ileo-cecectomy and creation of ileotomy 10/22. Improved.  -abx per id  -surgery following   -pain control  -abx   2. LETY: resolved. -monitor urine output and labs  3. Metastatic rectal cancer:  -follows with Dr. Jericho Nice  4.  HTN:  -eventually resume home bp

## 2020-10-24 NOTE — PROGRESS NOTES
INFECTIOUS DISEASE PROGRESS NOTE    Danny Olmstead Patient Status:  Inpatient    1950 MRN [id number]   [facility] 4SW-A Attending May Lino, *   Hosp Day # 2 PCP Jennifer Glasgow Negative for chest pain, syncope,  dyspnea, lower extremity edema. Gastrointestinal: as above  : Negative for dysuria, hematuria, frequency or flank pain  Integument: Negative for rash, skin lesions, pruritus.   Hematologic/lymphatic: Negative for easy b found for: ESRML   No results found for: CRP, HSCRP   No results found for: VANCT  Recent Labs   Lab 10/22/20  0430   COLORUR Michell*   CLARITY Hazy*   SPECGRAVITY 1.026   GLUUR Negative   BILUR Negative   KETUR Negative   BLOODURINE Negative   PHURINE 6.0 coronal MPR imaging was performed. Dose reduction techniques were used. Dose information is transmitted to the ACR [facility] [employer]) NRDR ([address]) which includes the Dose Index Registry.   PATIENT STATED HISTORY:(As appendix by fluid is maximum dimension distally was 0.9 cm presently 1.2 cm. Approximately 1.9 cm in thickness. Previously  1.4 cm. Increased stranding in fluid within the root of the mesentery and in the region of the omentum. Small hiatal hernia.  ABDO Has been on treatment all along  4. LETY- due to above. Better  5. Resolving leukocytosis- due to above  6. Metastatic rectal ca    PLAN:  - cover with siri and diflucan fow now (was on zosyn last admission, could have a R organism at this point).  This will

## 2020-10-24 NOTE — PLAN OF CARE
Report given to [facility]. Patient transferring to room 403  Patients wife and sister at bedside and updated    Assumed care from night shift RN. Patient alert and oriented x 3. On RA. VS stable. SR on monitor. NG to LIS  Ileostomy with serous drainage.

## 2020-10-24 NOTE — PROGRESS NOTES
[facility]  Progress Note    Ronold Foot Patient Status:  Inpatient    1950 MRN [id number]   [facility] 4SW-A Attending Isela Marin, *   Hosp Day # 2 PCP Jaxon Vazquez MD     Subjective:  Feeling better.   Pain

## 2020-10-24 NOTE — PROGRESS NOTES
[facility] Hospitalist Progress Note     PCP: Tommy Haywood MD    CC:  Follow up    SUBJECTIVE:  Pt sitting up in bed, transferred to medical floor. formation. Pain manageable.  No n/v/f/c.  +U  Became confused overnight with IV ativan- discontinued  OBJ AST 27 41* 30 25   ALB 2.6* 2.2* 2.2* 1.9*       Recent Labs   Lab 10/23/20  1154 10/23/20  1744 10/23/20  2359 10/24/20  0552 10/24/20  1138   PGLU 123* 114* 94 95 81       Recent Labs   Lab 10/21/20  2138   TROP <0.045          Meds:     • meropenem  5

## 2020-10-24 NOTE — PROGRESS NOTES
Able to dose on and off, still with confusion, forgetful, oriented to person and place, frequent reorientation. Tried to remove restraints this morning but still try to pull out contraptions, restraints kept in place.   Denies any pain the whole night, no

## 2020-10-24 NOTE — PLAN OF CARE
Received pt from icu. Pt ia ox x 4, on Ra. Vitals WLS. Pt denies pain at the moment. NG to LIS with green drainage. Ileostomy with serous drainage. J.p with serous drainage. Levin with clear yellow urine.   Aquacel dressing to midline dressing intact,old

## 2020-10-24 NOTE — PLAN OF CARE
1930H Received drowsy, easily arousable, oriented to self and place but disoriented to time and situation, + delirium, he was given Ativan earlier by AM shift RN because of anxiety. On room air, no SOB noted, denies any pain.   Reoriented to time and situat

## 2020-10-25 RX ORDER — POTASSIUM CHLORIDE 14.9 MG/ML
20 INJECTION INTRAVENOUS ONCE
Status: COMPLETED | OUTPATIENT
Start: 2020-10-25 | End: 2020-10-25

## 2020-10-25 NOTE — PLAN OF CARE
Pt oriented to self, place, and time on assessment. Pt disoriented/forgetful to situation, needing reminder regarding his recent surgery. VSS, pt afebrile. NPO except ice chips. Hypoactive bowel sounds noted, no gas noted in ileostomy bag.  Pt denies pain assessment  - Modify environment to reduce risk of injury  - Provide assistive devices as appropriate  - Consider OT/PT consult to assist with strengthening/mobility  - Encourage toileting schedule  Outcome: Progressing     Problem: 98 Krystal Botello ordered and tolerated  - Evaluate effectiveness of GI medications  - Encourage mobilization and activity  - Obtain nutritional consult as needed  - Establish a toileting routine/schedule  - Consider collaborating with pharmacy to review patient's medicatio Plan goals for specific interventions  Outcome: Progressing  Goal: Patient/Family Short Term Goal  Description: Patient's Short Term Goal:  Pain will be controlled. Interventions:   Monitor pain, follow pain protocol.   Keep a restful environment for sle

## 2020-10-25 NOTE — PROGRESS NOTES
[facility]  Progress Note    Luis E Gonzales Patient Status:  Inpatient    1950 MRN [id number]   [facility] 4NW-A Attending Husam Zepeda, *   Hosp Day # 3 PCP Leon Payne MD     Subjective:  Patient looks better t

## 2020-10-25 NOTE — BH PROGRESS NOTE
[facility] Resource Referral Counselor Note    Liam Mccabe Patient Status:  Inpatient    1950 MRN [id number]   [facility] 4NW-A Attending Artemio Sanchez, *   Hosp Day # 3 PCP Carlitos Pena MD       S(subjecti

## 2020-10-25 NOTE — PLAN OF CARE
A&O x4. VSS and afebrile. Denies any CP, MAYRA, or calf pain. Lungs clear and diminished in bases. NSR on tele. Abdomen soft, nontender, nondistended. Bowel sounds hypoactive but present, pt reports belching.  Ostomy moist, pink - putting our serosang drainag affect risk of falls.   - [place] fall precautions as indicated by assessment.  - Educate pt/family on patient safety including physical limitations  - Instruct pt to call for assistance with activity based on assessment  - Modify environment to reduce ri patient  Outcome: Progressing     Problem: GASTROINTESTINAL - ADULT  Goal: Maintains or returns to baseline bowel function  Description: INTERVENTIONS:  - Assess bowel function  - Maintain adequate hydration with IV or PO as ordered and tolerated  - Evalua hydration. Continue antibiotics to treat for infection. Encourage deep breathing, coughing exercises and IS use to prevent complications like pnuemonia. Promote rest and sleep for faster recovery.   - See additional Care Plan goals for specific interven

## 2020-10-25 NOTE — PROGRESS NOTES
[facility] Hospitalist Progress Note     PCP: Lcui Feilx MD    CC:  Follow up    SUBJECTIVE:  Pt sitting up in bed, NG in place. Family member at bedside  Pain manageable. No nausea.     OBJECTIVE:  Temp:  [87.3 °F (30.7 °C)-98.6 °F (37 °C)] 87.3 °F (30 117* 147* 133* 87  --        Recent Labs   Lab 10/21/20  2138 10/22/20  1607 10/23/20  0655 10/24/20  0531   ALT 12* 12* 11* 12*   AST 27 41* 30 25   ALB 2.6* 2.2* 2.2* 1.9*       Recent Labs   Lab 10/24/20  0552 10/24/20  1138 10/24/20  1755 10/25/20  001 family by bedside.        Thank Ashanti Rivera MD    [facility] Hospitalist  Answering Service number: [phone]

## 2020-10-26 RX ORDER — METRONIDAZOLE 500 MG/100ML
500 INJECTION, SOLUTION INTRAVENOUS EVERY 8 HOURS
Status: COMPLETED | OUTPATIENT
Start: 2020-10-26 | End: 2020-11-09

## 2020-10-26 NOTE — PHYSICAL THERAPY NOTE
PHYSICAL THERAPY EVALUATION - INPATIENT     Room Number: 403/403-A  Evaluation Date: 10/26/2020  Type of Evaluation: Initial  Physician Order: PT Eval and Treat    Presenting Problem: acute appendicitis w/perforation and peritonitis, s/p ex lap with il With: Spouse  Drives: No  Patient Owned Equipment: Cane;Rolling walker  Patient Regularly Uses: Glasses    Prior Level of Weber: Pt indep with ADL's and mobility.  Does not use AD at baseline, but after DC from last admit on 10/16/20, he was using a chair (including a wheelchair)?: A Little   -   Need to walk in hospital room?: A Little   -   Climbing 3-5 steps with a railing?: A Little       AM-PAC Score:  Raw Score: 18   Approx Degree of Impairment: 46.58%   Standardized Score (AM-PAC Scale): 43.63 a 78year old male admitted on 10/21/2020 for presenting problems above. Pertinent comorbidities and personal factors impacting therapy include 4 items as documented above.       In this PT evaluation, the patient presents with the following impairments: Goal #5    Goal #6    Goal Comments: Goals established on 10/26/2020    Therapist treating pt in surgical mask and gloves. Pt in surgical mask throughout. Therapist accompanied by no one. Pt's sister in surgical mask throughout.

## 2020-10-26 NOTE — PROGRESS NOTES
ngt clamped for 6 hours. Pt tolerated well. Denies n/v or pain. Ileostomy draining dark green/brown drainage in good amounts. Stoma pink. Appliance leaking earlier this afternoon and changed. New appliance intact.  Residual checked on ngt and was 0ml. ngt d

## 2020-10-26 NOTE — PLAN OF CARE
Problem: PAIN - ADULT  Goal: Verbalizes/displays adequate comfort level or patient's stated pain goal  Description: INTERVENTIONS:  - Encourage pt to monitor pain and request assistance  - Assess pain using appropriate pain scale  - Administer analgesics appropriate  - Identify discharge learning needs (meds, wound care, etc)  - Arrange for interpreters to assist at discharge as needed  - Consider post-discharge preferences of patient/family/discharge partner  - Complete POLST form as appropriate  - Assess output and patient weight  - Monitor urine specific gravity, serum osmolarity and serum sodium as indicated or ordered  - Monitor response to interventions for patient's volume status, including labs, urine output, blood pressure (other measures as availab Notify patient and family of reasons restraints applied  - Assess for any contributing factors to confusion (electrolyte disturbances, delirium, medications)  - Discontinue any unnecessary medical devices as soon as possible  - Assess the patient's physica

## 2020-10-26 NOTE — PROGRESS NOTES
INFECTIOUS DISEASE PROGRESS NOTE    Crow Lozano Patient Status:  Inpatient    1950 MRN [id number]   [facility] 4SW-A Attending Betty Horvath, *   Hosp Day # 4 PCP Cordelia Mitchell 148/82, pulse 67, temperature 97.9 °F (36.6 °C), temperature source Oral, resp. rate 19, height 5' 8\" (1.727 m), weight 158 lb 6.4 oz (71.8 kg), SpO2 97 %. HEENT: no scleral icterus or conjunctival injection. Moist mucous membranes.  No thrush. + NG tube PHURINE 6.0   PROUR 30 *   UROBILINOGEN <2.0   NITRITE Negative   LEUUR Trace*   WBCUR 5-10*   RBCUR 0-2   BACUR Rare*   EPIUR None Seen        Microbiology    Reviewed in EMR,   Hospital Encounter on 10/21/20   1.  AEROBIC BACTERIAL CULTURE     Status: A STATED HISTORY:(As transcribed by Technologist)  Patient complains of right upper and lower quadrant abdominal pain with diarrhea.    CONTRAST USED:  100cc of Omnipaque 350  FINDINGS:  LIVER:  No significant change in the multiple hepatic metastatic lesions hiatal hernia. ABDOMINAL WALL:  No mass or hernia. URINARY BLADDER:  No visible focal wall thickening, lesion, or calculus. PELVIC NODES:  No adenopathy. PELVIC ORGANS:  No visible mass. Pelvic organs appropriate for patient age.   BONES:  Bilateral L5 diflucan and change to CTX and Flagyl this am  - follow temps and wbc  - follow abd exam    Discussed case with Dr. Ludwig, patient and patient's family member at the bedside. Juanita Crowley PA-C    ID ATTENDING ADDENDUM     Pt seen an examined i

## 2020-10-26 NOTE — PROGRESS NOTES
Pt resting in bed, forgetful at times, reorient. Easily. Pt has easy non labored breathing on ra. cpox and telemetry in place. Bilateral scd's in place. Levin draining yellow clear urine.  Midline incision with Aquacel in place with small amount of old drai

## 2020-10-26 NOTE — PROGRESS NOTES
AMINA Hospitalist Progress Note     [facility]      SUBJECTIVE:  No acute events overnight   NG clamped this AM  No Nausea or emesis    OBJECTIVE:  Temp:  [97.9 °F (36.6 °C)-98.3 °F (36.8 °C)] 98.3 °F (36.8 °C)  Pulse:  [64-78] 78  Resp:  [15-21] 19 Lab 10/25/20  1308 10/25/20  1819 10/26/20  0022 10/26/20  0622 10/26/20  1248   PGLU 114* 105* 111* 120* 118*       Imaging:  Xr Abdomen (1 View) (cpt=74018)    Result Date: 10/9/2020  PROCEDURE:  XR ABDOMEN (1 VIEW) (CPT=74018)  INDICATIONS:  abd pain cancer. CONTRAST USED:  100cc of Omnipaque 350  FINDINGS:   Limited views of the chest demonstrate numerous areas of presumed pulmonary metastatic disease, stable from the prior exam.  Mild basilar atelectasis has slightly progressed.   Again noted is a l bowel with a small amount of intra-abdominal ascites which is new. Some areas of the colon demonstrate prominent wall thickening which have increased in prominence from the prior exam, particularly in the region of the distal sigmoid colon and rectum.   A scanning was performed from the dome of the diaphragm to the pubic symphysis with non-ionic intravenous contrast material. Post contrast coronal MPR imaging was performed. Dose reduction techniques were used.  Dose information is transmitted to the ACR (Am especially the antrum and duodenal bulb. Is there is no free air. There are numerous appendicoliths. There is increased distension of the appendix by fluid is maximum dimension distally was 0.9 cm presently 1.2 cm. Approximately 1.9 cm in thickness.   P (er)    Result Date: 10/7/2020  PROCEDURE:  CT ABDOMEN PELVIS IV CONTRAST, NO ORAL (ER)  COMPARISON:  Outside exam from 8/3/2020.   INDICATIONS:  abd pain  TECHNIQUE:  CT scanning was performed from the dome of the diaphragm to the pubic symphysis with non- correlation recommended. There is mild to moderate thickening of the rectosigmoid colon with underlying nonspecific colitis and/or neoplasm not entirely excluded. Clinical correlation recommended. Scattered colonic diverticulosis.   Scattered feces in th changes of a previous vasectomy.   There is stable nonspecific nodular thickening along the bilateral spermatic cords with the largest focal area on the right measuring up to 3.1 x 1.9 cm in the largest focal area on the left measuring up to 1.9 x 1.7 cm wh appendicitis with perforation and peritonitis s/p ex lap with cecetomy with ileostomy  - in setting of recent admission for typhlitis  - surgery and ID on consult- appreciate  - IV dilaudid prn, antiemetics.   - IV abx changed from meropenem to ceftrixone/f

## 2020-10-26 NOTE — DIETARY NOTE
[facility] ASSESSMENT    Pt does not meet malnutrition criteria at this time.     NUTRITION DIAGNOSIS/PROBLEM:    Inadequate energy intake related to decreased ability to consume sufficient energy intake and physiologic causes in ALLIE  Intake: 0%-NPO  Intake Meeting Needs: No  Food Allergies: No  Cultural/Ethnic/[place]beterian Preferences Addresses: Yes    NUTRITION RELATED PHYSICAL FINDINGS:  Unable to conduct nutrition focused physical exam at this time.      NUTRITION PRESCRIPTION: 71

## 2020-10-26 NOTE — PROGRESS NOTES
A&Ox4. VSS. Tele-NSR. Denies pain and nausea. NPO. Levin catheter in place. Abdomen soft, rounded, nondistended, and nontender. Bowel sounds hypoactive. NGT in place at 67 cm. NGT clamped at 1100. Ileostomy with brown, liquid stool. Stoma pink.  Midline inc

## 2020-10-26 NOTE — PROGRESS NOTES
[facility]  Progress Note    David Gilliland Patient Status:  Inpatient    1950 MRN [id number]   [facility] 4NW-A Attending Jose L Torres MD   [facility] Day # 4 PCP Amara Price MD     Subjective:    Patient having stool outpu answered    NAV Phan [address] [place] [address]  General Surgery  10/26/2020

## 2020-10-27 RX ORDER — MAGNESIUM OXIDE 400 MG (241.3 MG MAGNESIUM) TABLET
2 TABLET NIGHTLY PRN
Status: DISCONTINUED | OUTPATIENT
Start: 2020-10-27 | End: 2020-11-10

## 2020-10-27 RX ORDER — POTASSIUM CHLORIDE 20 MEQ/1
40 TABLET, EXTENDED RELEASE ORAL EVERY 4 HOURS
Status: COMPLETED | OUTPATIENT
Start: 2020-10-27 | End: 2020-10-27

## 2020-10-27 RX ORDER — FAMOTIDINE 20 MG/1
20 TABLET ORAL 2 TIMES DAILY
Status: DISCONTINUED | OUTPATIENT
Start: 2020-10-27 | End: 2020-11-01

## 2020-10-27 RX ORDER — LORAZEPAM 2 MG/ML
0.5 INJECTION INTRAMUSCULAR ONCE AS NEEDED
Status: DISCONTINUED | OUTPATIENT
Start: 2020-10-27 | End: 2020-10-27

## 2020-10-27 RX ORDER — FAMOTIDINE 10 MG/ML
20 INJECTION, SOLUTION INTRAVENOUS 2 TIMES DAILY
Status: DISCONTINUED | OUTPATIENT
Start: 2020-10-27 | End: 2020-10-28

## 2020-10-27 NOTE — PROGRESS NOTES
Pharmacy renal dose adjustment for famotidine (Pepcid)    Sydnee Joel has been receiving renally adjusted famotidine 20 mg every 24 hours. Estimated Creatinine Clearance: 108.6 mL/min (A) (based on SCr of 0.63 mg/dL (L)).     The estimated creatinin

## 2020-10-27 NOTE — PROGRESS NOTES
[facility]  Progress Note    Sydnee Joel Patient Status:  Inpatient    1950 MRN [id number]   [facility] 4NW-A Attending Alexander Loza MD   Hosp Day # 5 PCP Josey Haas MD     Subjective:    Patient reports he has had

## 2020-10-27 NOTE — PLAN OF CARE
A&Ox4, VSS, /Tele. Lungs clear bilaterally/diminished in bases. Abd is soft non-tender, non-distended. Midline incision w/ staples is c/d/I, L CARMITA drain returning serosanguinous fluid. Ilestomy returning green/mucous consistency output.  bowel sounds hypo ordered  - Implement neutropenic guidelines  Outcome: Progressing

## 2020-10-27 NOTE — PROGRESS NOTES
Levin removed this am at 1030. Pt remains dtv. Denies need to at present. Pt bladder scanned and PVR of 275 noted. Will give patient two more hours to attempt to void per protocol and continue to monitor.

## 2020-10-27 NOTE — PROGRESS NOTES
INFECTIOUS DISEASE PROGRESS NOTE    Bro Her Patient Status:  Inpatient    1950 MRN [id number]   [facility] 4SW-A Attending Valentino Papas, *   Hosp Day # 5 PCP Daren Alonso pertinent positives and negatives in the the HPI. Physical Exam:    General: No acute distress. Alert. Vital signs: Blood pressure 118/87, pulse 82, temperature 98.1 °F (36.7 °C), temperature source Oral, resp.  rate 22, height 5' 8\" (1.727 m), weight 5. 6* 5.4* 5.0*  --   --   --   --     < > = values in this interval not displayed.      No results found for: ESRML   No results found for: CRP, HSCRP   No results found for: [employer]  Recent Labs   Lab 10/22/20  [address] the diaphragm to the pubic symphysis with non-ionic intravenous contrast material. Post contrast coronal MPR imaging was performed. Dose reduction techniques were used.  Dose information is transmitted to the ACR [facility] [employer] [facility]) NRDR (Natio there is no free air. There are numerous appendicoliths. There is increased distension of the appendix by fluid is maximum dimension distally was 0.9 cm presently 1.2 cm. Approximately 1.9 cm in thickness. Previously  1.4 cm.  Increased stranding in flu ileostomy on 10/22, cx with strep constellatus and enterobacter cloacae complex. 2. Sepsis due to above  3. Recent eubacterium blood stream infection- due to above. Has been on treatment all along  4. LETY- due to above. Resolved. Ucx negative.   5. Resolve

## 2020-10-27 NOTE — PROGRESS NOTES
TEODOROG Hospitalist Progress Note     [facility]      SUBJECTIVE:  Had episode of NSVT on tele this AM  Asymptomatic  No N/V    OBJECTIVE:  Temp:  [97.7 °F (36.5 °C)-98.8 °F (37.1 °C)] 98.1 °F (36.7 °C)  Pulse:  [68-87] 82  Resp:  [16-22] 22  BP: (118-1 27 41* 30 25   ALB 2.6* 2.2* 2.2* 1.9*       Recent Labs   Lab 10/26/20  1248 10/26/20  1831 10/27/20  0021 10/27/20  0728 10/27/20  1224   PGLU 118* 97 120* 118* 114*       Imaging:  Xr Abdomen (1 View) (cpt=74018)    Result Date: 10/9/2020  PROCEDURE:  X Technologist)  Patient has bacteremia with history of rectal cancer.    CONTRAST USED:  100cc of Omnipaque 350  FINDINGS:   Limited views of the chest demonstrate numerous areas of presumed pulmonary metastatic disease, stable from the prior exam.  Mild bas There has been interval increase in distention of the small bowel with a small amount of intra-abdominal ascites which is new.   Some areas of the colon demonstrate prominent wall thickening which have increased in prominence from the prior exam, particular abdominal pain, history of rectal cancer. TECHNIQUE:  CT scanning was performed from the dome of the diaphragm to the pubic symphysis with non-ionic intravenous contrast material. Post contrast coronal MPR imaging was performed.   Dose reduction techniques wall thickening involving the stomach and duodenum especially the antrum and duodenal bulb. Is there is no free air. There are numerous appendicoliths.   There is increased distension of the appendix by fluid is maximum dimension distally was 0.9 cm prese Ct Abdomen Pelvis Iv Contrast, No Oral (er)    Result Date: 10/7/2020  PROCEDURE:  CT ABDOMEN PELVIS IV CONTRAST, NO ORAL (ER)  COMPARISON:  Outside exam from 8/3/2020.   INDICATIONS:  abd pain  TECHNIQUE:  CT scanning was performed from the dome of the appendicitis are not entirely excluded. Clinical correlation recommended. There is mild to moderate thickening of the rectosigmoid colon with underlying nonspecific colitis and/or neoplasm not entirely excluded. Clinical correlation recommended.   Scatte of abdominal and pelvic ascites. 7. There are changes of a previous vasectomy.   There is stable nonspecific nodular thickening along the bilateral spermatic cords with the largest focal area on the right measuring up to 3.1 x 1.9 cm in the largest focal a typhilitis is admitted for worsening abdominal pain.      # sepsis (resolved)secondary to acute appendicitis with perforation and peritonitis s/p ex lap with cecetomy with ileostomy  - in setting of recent admission for typhlitis  - surgery and ID on consu

## 2020-10-27 NOTE — CONSULTS
[facility]  Report of Inpatient Ostomy Consultation     Aimee Tracy Patient Status:  Inpatient    1950 MRN [id number]   [facility] 4NW-A 403/403-A Attending Yakov Hurst MD   Hosp Day # 5 PCP Onel Garvey MD Video yes    Learning packet   yes    Demonstration  no    Return Demonstration  no            Outcome of Education:  Needs reinforcement and Shows understanding      IMPRESSION/PLAN:    Products used: [place] colostomy/ileostomy pouch #[id number]   Would be

## 2020-10-27 NOTE — PROGRESS NOTES
A&Ox4. VSS. Tele. Episode of 35 beats of vtach and overnight had PVCs. MD notified. Lung sounds clear and diminished. Abdomen soft, rounded, nondistended, and nontender. Bowel sounds hypoactive. Ileostomy with brown, green mucous consistency output.  Report

## 2020-10-28 ENCOUNTER — APPOINTMENT (OUTPATIENT)
Dept: CT IMAGING | Facility: HOSPITAL | Age: 70
DRG: 853 | End: 2020-10-28
Attending: PHYSICIAN ASSISTANT
Payer: COMMERCIAL

## 2020-10-28 PROCEDURE — 74177 CT ABD & PELVIS W/CONTRAST: CPT | Performed by: PHYSICIAN ASSISTANT

## 2020-10-28 RX ORDER — TAMSULOSIN HYDROCHLORIDE 0.4 MG/1
0.4 CAPSULE ORAL DAILY
Status: DISCONTINUED | OUTPATIENT
Start: 2020-10-28 | End: 2020-11-10

## 2020-10-28 RX ORDER — ONDANSETRON 2 MG/ML
4 INJECTION INTRAMUSCULAR; INTRAVENOUS EVERY 6 HOURS PRN
Status: DISCONTINUED | OUTPATIENT
Start: 2020-10-28 | End: 2020-11-10

## 2020-10-28 RX ORDER — POTASSIUM CHLORIDE 14.9 MG/ML
20 INJECTION INTRAVENOUS ONCE
Status: COMPLETED | OUTPATIENT
Start: 2020-10-28 | End: 2020-10-28

## 2020-10-28 NOTE — PROGRESS NOTES
TEODOROG Hospitalist Progress Note     [facility]      SUBJECTIVE:  Had urinary retention overnight, straight cath x 1  Having some cramping today, no N/V    OBJECTIVE:  Temp:  [97.9 °F (36.6 °C)-98.4 °F (36.9 °C)] 98.3 °F (36.8 °C)  Pulse:  [74-82] 81 interval not displayed.        Recent Labs   Lab 10/21/20  2138 10/22/20  1607 10/23/20  0655 10/24/20  0531   ALT 12* 12* 11* 12*   AST 27 41* 30 25   ALB 2.6* 2.2* 2.2* 1.9*       Recent Labs   Lab 10/27/20  0728 10/27/20  1224 10/27/20  1647 10/27/20  23 Radiology) NRDR ([address]) which includes the Dose Index Registry. PATIENT STATED HISTORY:(As transcribed by Technologist)  Patient has bacteremia with history of rectal cancer.    CONTRAST USED:  100cc of Omnipaque 350  FINDINGS: assessment. Stable appearance of the bilateral inguinal regions. Please refer to prior study for further details.             CONCLUSION:  There has been interval increase in distention of the small bowel with a small amount of intra-abdominal ascites whi CONTRAST, NO ORAL (ER), 10/07/2020, 4:18 PM.  [facility] , CT, CT ABDOMEN+PELVIS(CONTRAST ONLY)(CPT=74177), 10/12/2020, 8:21 PM.  INDICATIONS:  abdominal pain, history of rectal cancer.   TECHNIQUE:  CT scanning was performed from the dome of the diaphragm to t circumferential wall thickening involving numerous loops of small bowel diffusely from terminal ileum to the level the jejunum. There is some wall thickening involving the stomach and duodenum especially the antrum and duodenal bulb.   Is there is no free Dictated by (CST): Catrina Egan MD on 10/21/2020 at 11:35 PM     Finalized by (CST): Catrina Egan MD on 10/21/2020 at 11:48 PM       Ct Abdomen Pelvis Iv Contrast, No Oral (er)    Result Date: 10/7/2020  PROCEDURE:  CT ABDOMEN PELVIS IV CONTRAS measuring up to 1.4 cm in diameter. The largest appendicoliths measures up to 1.3 cm in diameter. An underlying mucocele or acute appendicitis are not entirely excluded. Clinical correlation recommended.   There is mild to moderate thickening of the rect Worsening hepatic metastatic disease. 5. Partially imaged scattered pulmonary metastatic disease again noted. 6. Scattered small amount of abdominal and pelvic ascites. 7. There are changes of a previous vasectomy.   There is stable nonspecific nodular t PMH including but not limited to stage IV rectal cancer, HTN, recent admission for typhilitis is admitted for worsening abdominal pain.      # sepsis (resolved)secondary to acute appendicitis with perforation and peritonitis s/p ex lap with cecetomy with i

## 2020-10-28 NOTE — PLAN OF CARE
Patient alert and oriented x4, c/o severe abdominal cramping. Patient's abdomen soft, non-distended, non-tender, bowel sounds hypoactive. Patient with poor oral intake. Abdominal incision with staples, clean dry and intact, CARMITA drain with no output.  Ileosto

## 2020-10-28 NOTE — PROGRESS NOTES
4976: MD paged regarding bladder scan of 395.  0135: Dr Mckenzie Perez paged back. Patient need straight catheterization.

## 2020-10-28 NOTE — DIETARY NOTE
[facility] ASSESSMENT    Pt does not meet malnutrition criteria at this time.     NUTRITION DIAGNOSIS/PROBLEM:  Inadequate energy intake related to decreased ability to consume sufficient energy intake and physiologic causes increa lb)  08/03/20 : 67.8 kg (149 lb 6.4 oz)  07/20/20 : 65.7 kg (144 lb 12.8 oz)  07/06/20 : 67.4 kg (148 lb 9.6 oz)  06/22/20 : 66.7 kg (147 lb)  06/08/20 : 68.7 kg (151 lb 6.4 oz)    NUTRITION:  Diet: Clear Liquids  Oral Supplements: Ensure Clear     FOOD/NU

## 2020-10-28 NOTE — PROGRESS NOTES
INFECTIOUS DISEASE PROGRESS NOTE    Crow Lozano Patient Status:  Inpatient    1950 MRN [id number]   [facility] 4SW-A Attending Betty Horvath, *   Hosp Day # 6 PCP Cordelia Mitchell the the HPI. Physical Exam:    General: No acute distress. Alert. Vital signs: Blood pressure 117/86, pulse 81, temperature 98.3 °F (36.8 °C), temperature source Oral, resp. rate 19, height 5' 8\" (1.727 m), weight 158 lb 6.4 oz (71.8 kg), SpO2 92 %. --   --    BILT 0.5 0.5 0.4  --   --   --   --   --    TP 5.6* 5.4* 5.0*  --   --   --   --   --     < > = values in this interval not displayed.      No results found for: ESRML   No results found for: CRP, HSCRP   No results found for: [place] cancer. TECHNIQUE:  CT scanning was performed from the dome of the diaphragm to the pubic symphysis with non-ionic intravenous contrast material. Post contrast coronal MPR imaging was performed. Dose reduction techniques were used.  Dose information is tr stomach and duodenum especially the antrum and duodenal bulb. Is there is no free air. There are numerous appendicoliths. There is increased distension of the appendix by fluid is maximum dimension distally was 0.9 cm presently 1.2 cm. Approximately 1. appendicitis with peritonitis s/p exp lap with cecectomy and ileostomy on 10/22, cx with strep constellatus and enterobacter cloacae complex. 2. Sepsis due to above  3. Recent eubacterium blood stream infection- due to above.  Has been on treatment all xavier

## 2020-10-28 NOTE — IMAGING NOTE
Spoke with Floyd Brownlee RN patients nurse regarding tomorrows CT guided abdominal abscess drain; tentative time 3PM  Instructions reviewed:   NPO after light breakfast   Accu-check prior to arrival to CT suite   PT/INR 10/29/2020 am   Hold evening Lovenox

## 2020-10-28 NOTE — PROGRESS NOTES
[facility]  Progress Note    Jarek Jackson Patient Status:  Inpatient    1950 MRN [id number]   [facility] 4NW-A Attending Betzaida Gambino MD   [facility] Day # 6 PCP Joellen Pedraza MD     Subjective:    Patient with decreased bertram [place]  [address]  General Surgery  10/28/2020

## 2020-10-28 NOTE — CM/SW NOTE
Care Progression Note:  Active Acute Medical Issue:   LETY (acute kidney injury) ([facility] [address]     Other Contributing Medical Factors/Dx.: Metastatic colorectal cancer, presenting with acute appendicitis.     Length of stay: 6  Avoidable Delays:   Discharge Barriers

## 2020-10-28 NOTE — PLAN OF CARE
A&Ox4. VSS. RA. . Abdomen soft, rounded, nondistended. Passing gas. Denies nausea. CARMITA drain intact draining serosanguinous fluid. Ileostomy intact, stoma pink and moist--draining green stool. Denies pain. Up with standby assist and walker. IVF.  IV abx s with strengthening/mobility  - Encourage toileting schedule  Outcome: Progressing     Problem: DISCHARGE PLANNING  Goal: Discharge to home or other facility with appropriate resources  Description: INTERVENTIONS:  - Identify barriers to discharge w/pt and needed  - Establish a toileting routine/schedule  - Consider collaborating with pharmacy to review patient's medication profile  Outcome: Progressing     Problem: METABOLIC/FLUID AND ELECTROLYTES - ADULT  Goal: Hemodynamic stability and optimal renal funct home.    Interventions:  Monitor for any s/s of infection and report. Continue IVF for hydration. Continue antibiotics to treat for infection. Encourage deep breathing, coughing exercises and IS use to prevent complications like pnuemonia.   Promote res

## 2020-10-29 ENCOUNTER — APPOINTMENT (OUTPATIENT)
Dept: CT IMAGING | Facility: HOSPITAL | Age: 70
DRG: 853 | End: 2020-10-29
Attending: PHYSICIAN ASSISTANT
Payer: COMMERCIAL

## 2020-10-29 PROCEDURE — 0D9W30Z DRAINAGE OF PERITONEUM WITH DRAINAGE DEVICE, PERCUTANEOUS APPROACH: ICD-10-PCS | Performed by: RADIOLOGY

## 2020-10-29 PROCEDURE — 99152 MOD SED SAME PHYS/QHP 5/>YRS: CPT | Performed by: PHYSICIAN ASSISTANT

## 2020-10-29 PROCEDURE — 49406 IMAGE CATH FLUID PERI/RETRO: CPT | Performed by: PHYSICIAN ASSISTANT

## 2020-10-29 RX ORDER — MIDAZOLAM HYDROCHLORIDE 1 MG/ML
INJECTION INTRAMUSCULAR; INTRAVENOUS
Status: COMPLETED
Start: 2020-10-29 | End: 2020-10-29

## 2020-10-29 RX ORDER — SODIUM CHLORIDE 9 MG/ML
INJECTION, SOLUTION INTRAVENOUS CONTINUOUS
Status: DISCONTINUED | OUTPATIENT
Start: 2020-10-29 | End: 2020-10-29 | Stop reason: HOSPADM

## 2020-10-29 RX ORDER — MIDAZOLAM HYDROCHLORIDE 1 MG/ML
1 INJECTION INTRAMUSCULAR; INTRAVENOUS EVERY 5 MIN PRN
Status: DISCONTINUED | OUTPATIENT
Start: 2020-10-29 | End: 2020-10-29 | Stop reason: HOSPADM

## 2020-10-29 RX ORDER — NALOXONE HYDROCHLORIDE 0.4 MG/ML
80 INJECTION, SOLUTION INTRAMUSCULAR; INTRAVENOUS; SUBCUTANEOUS AS NEEDED
Status: DISCONTINUED | OUTPATIENT
Start: 2020-10-29 | End: 2020-10-29 | Stop reason: HOSPADM

## 2020-10-29 RX ORDER — FLUMAZENIL 0.1 MG/ML
0.2 INJECTION, SOLUTION INTRAVENOUS AS NEEDED
Status: DISCONTINUED | OUTPATIENT
Start: 2020-10-29 | End: 2020-10-29 | Stop reason: HOSPADM

## 2020-10-29 NOTE — IMAGING NOTE
Received pt to CT 1. Pt verified name and  as well as allergies. Pt states NPO since yesterday. Pt RN states blood thinners- lovenox held, last dose 10/27. Lab results of PT/INR and PLT reviewed. Informed consent obtained by Dr. Pratik Lizama.   Pt posi

## 2020-10-29 NOTE — PLAN OF CARE
A&Ox4, VSS, lungs clear bilaterally/diminished in bases. Abd soft, non-tender, non-distended, abd sounds hypoactive, pt states they are passing gas and belching. Levin cath in place. Ilestomy c/d/i returning liquid output.  Currently resting in bed w/ call

## 2020-10-29 NOTE — PROCEDURES
[facility]  Procedure Note    Crow Lozano Patient Status:  Inpatient    1950 MRN [id number]   [facility] 4NW-A Attending Eliu Greenfield, DO   Hosp Day # 7 PCP Aubrey Santos MD     Procedure: Right abdominal fluid collec

## 2020-10-29 NOTE — PROGRESS NOTES
** I AGREE WITH CHARTING OF FILOMENA Munroe.     9405: PATIENT TRANSPORTED TO RADIOLOGY FOR DRAIN PLACEMENT IN STABLE CONDITION. FAMILY ACCOMPANIED. 1520: PATIENT RETURNED TO THE FLOOR VIA BED FROM RADIOLOGY IN STABLE CONDITION.  FAMILY PRESENT AT [facility]

## 2020-10-29 NOTE — PROGRESS NOTES
[facility] Hospitalist Progress Note     [facility]      SUBJECTIVE:  Abdominal pain not as bad today.  Denies n/v, cp and sob    OBJECTIVE:  Temp:  [97.6 °F (36.4 °C)-98.2 °F (36.8 °C)] 98.2 °F (36.8 °C)  Pulse:  [71-83] 82  Resp:  [11-19] 12  BP: (127-150 10/28/20  2117 10/29/20  0829 10/29/20  1206   PGLU 101* 98 122* 101* 108*       Imaging:  Xr Abdomen (1 View) (cpt=74018)    Result Date: 10/9/2020  PROCEDURE:  XR ABDOMEN (1 VIEW) (CPT=74018)  INDICATIONS:  abd pain and distention  COMPARISON:  ROCÍO , 350  FINDINGS:   Limited views of the chest demonstrate numerous areas of presumed pulmonary metastatic disease, stable from the prior exam.  Mild basilar atelectasis has slightly progressed.   Again noted is a large lesion arising from the right hepatic lo ascites which is new. Some areas of the colon demonstrate prominent wall thickening which have increased in prominence from the prior exam, particularly in the region of the distal sigmoid colon and rectum.   A nonspecific colitis and/or proctitis is favor diaphragm to the pubic symphysis with non-ionic intravenous contrast material. Post contrast coronal MPR imaging was performed. Dose reduction techniques were used.  Dose information is transmitted to the [facility]) José Miguel Dunn [address] no free air. There are numerous appendicoliths. There is increased distension of the appendix by fluid is maximum dimension distally was 0.9 cm presently 1.2 cm. Approximately 1.9 cm in thickness. Previously  1.4 cm.  Increased stranding in fluid within CONTRAST, NO ORAL (ER)  COMPARISON:  Outside exam from 8/3/2020.   INDICATIONS:  abd pain  TECHNIQUE:  CT scanning was performed from the dome of the diaphragm to the pubic symphysis with non-ionic intravenous contrast material. Post contrast coronal MPR im the rectosigmoid colon with underlying nonspecific colitis and/or neoplasm not entirely excluded. Clinical correlation recommended. Scattered colonic diverticulosis. Scattered feces in the colon.   Scattered small amount of ascites in the abdomen and pel thickening along the bilateral spermatic cords with the largest focal area on the right measuring up to 3.1 x 1.9 cm in the largest focal area on the left measuring up to 1.9 x 1.7 cm which is nonspecific and could be postprocedural, with metastatic implan but not limited to stage IV rectal cancer, HTN, recent admission for typhilitis is admitted for worsening abdominal pain.      # sepsis (resolved)secondary to acute appendicitis with perforation and peritonitis s/p ex lap with cecetomy with ileostomy  - in

## 2020-10-29 NOTE — WOUND PROGRESS NOTE
[facility]  Report of Inpatient Ostomy Progress     Kp Baeza Patient Status:  Inpatient    1950 MRN [id number]   [facility] 4NW-A 403/403-A Attending Donavan Orourke, [address] [facility] Day # 7 PCP MD Angela Gracia tools used    Video yes    Learning packet   yes    Demonstration  yes    Return Demonstration  no            Outcome of Education:  Needs reinforcement, Observed demonstration and Shows understanding      Total time spent with patient:  30 Minutes       P

## 2020-10-29 NOTE — CM/SW NOTE
Per surgery, pt may need to start TPN if he is not able to tolerate a PO diet. Might also need IV ABX at dc. SW to remain available and follow up as needs arise.

## 2020-10-29 NOTE — PROGRESS NOTES
INFECTIOUS DISEASE PROGRESS NOTE    Gil Robles Patient Status:  Inpatient    1950 MRN [id number]   [facility] 4SW-A Attending Cami Marlow, *   Hosp Day # 7 PCP Sonam Humphrey air.  Vital signs: Blood pressure 127/84, pulse 74, temperature 98.2 °F (36.8 °C), temperature source Temporal, resp. rate 18, height 5' 8\" (1.727 m), weight 158 lb 6.4 oz (71.8 kg), SpO2 93 %. HEENT: no scleral icterus or conjunctival injection.  Moist m --   --   --     < > = values in this interval not displayed.      No results found for: ESRML   No results found for: CRP, HSCRP   No results found for: VANCT  No results for input(s): Nayana Zaldivar, [address] [facility], [address], [address] [facility] Rafy, Lissett CHÁVEZ diaphragm to the pubic symphysis with non-ionic intravenous contrast material. Post contrast coronal MPR imaging was performed. Dose reduction techniques were used.  Dose information is   transmitted to the [facility]) José Miguel Dunn [address] There is a surgical drain in the pelvis. ABDOMINAL WALL: There is postoperative change in the ventral abdominal wall from the recent appendectomy. URINARY BLADDER: There is a Levin catheter in the bladder. PELVIC NODES: No adenopathy.    PELVIC ORGANS IR drainage of fluid collection today  - follow temps and wbc  - follow abd exam  - diet as per surgery--> noted plans for possible TPN if unable to tolerate po    Discussed case with patient, patient's wife and patient's sister at the bedside.     Jas

## 2020-10-29 NOTE — PLAN OF CARE
Patient A/0 X 3. VSS. IVF infusing per orders. Potassium IV given per protocol. Midline incision ALEX, clean, dry and intact. Patient NPO for IR procedure this afternoon. Patient denies pain.        Problem: PAIN - ADULT  Goal: Verbalizes/displays adequate c INTERVENTIONS:  - Identify barriers to discharge w/pt and caregiver  - Include patient/family/discharge partner in discharge planning  - Arrange for needed discharge resources and transportation as appropriate  - Identify discharge learning needs (meds, wo ADULT  Goal: Incision(s), wounds(s) or drain site(s) healing without S/S of infection  Description: INTERVENTIONS:  - Assess and document risk factors for pressure ulcer development  - Assess and document skin integrity  - Assess and document dressing/inci

## 2020-10-29 NOTE — PROGRESS NOTES
[facility]  Progress Note    Crow Lozano Patient Status:  Inpatient    1950 MRN [id number]   [facility] 4NW-A Attending Eliu Greenfield, [address] [facility] Day # 7 PCP Aubrey Santos MD     Subjective:    Patient reprots pain impr ambulation/IS  All questions answered    Janeth Mena, [address]  General Surgery  10/29/2020

## 2020-10-30 ENCOUNTER — APPOINTMENT (OUTPATIENT)
Dept: CT IMAGING | Facility: HOSPITAL | Age: 70
DRG: 853 | End: 2020-10-30
Attending: RADIOLOGY
Payer: COMMERCIAL

## 2020-10-30 PROCEDURE — 0DPW30Z REMOVAL OF DRAINAGE DEVICE FROM PERITONEUM, PERCUTANEOUS APPROACH: ICD-10-PCS | Performed by: RADIOLOGY

## 2020-10-30 PROCEDURE — 76080 X-RAY EXAM OF FISTULA: CPT | Performed by: RADIOLOGY

## 2020-10-30 PROCEDURE — 49424 ASSESS CYST CONTRAST INJECT: CPT | Performed by: RADIOLOGY

## 2020-10-30 RX ORDER — POTASSIUM CHLORIDE 1.5 G/1.77G
40 POWDER, FOR SOLUTION ORAL EVERY 4 HOURS
Status: DISPENSED | OUTPATIENT
Start: 2020-10-30 | End: 2020-10-30

## 2020-10-30 NOTE — PROGRESS NOTES
[facility] Hospitalist Progress Note     [facility]      SUBJECTIVE:  Not eating much still. No appetite, nauseous and crampy pain when he eats.      OBJECTIVE:  Temp:  [97.5 °F (36.4 °C)-98.2 °F (36.8 °C)] 98 °F (36.7 °C)  Pulse:  [67-86] 67  Resp:  [11-22 INDICATIONS:  abd pain and distention  COMPARISON:  [facility] , CT, CT ABDOMEN PELVIS IV CONTRAST, NO ORAL (ER), 10/07/2020, 4:18 PM.  TECHNIQUE:  Supine AP view was obtained.   PATIENT STATED HISTORY: (As transcribed by Technologist)  Patient provided no eze Again noted is a large lesion arising from the right hepatic lobe which most likely represents a metastatic focus. This measures up to 13.4 x 8.1 cm, overall stable accounting for differences in technique. Numerous other hepatic lesions are again noted. and rectum. A nonspecific colitis and/or proctitis is favored. There is distention of the appendix with underlying fecaliths.   Overall this is decreased in prominence from the prior exam although differential considerations from the prior exam are Lana the ACR (American College of Radiology) NRDR ([address]) which includes the Dose Index Registry.   PATIENT STATED HISTORY:(As transcribed by Technologist)  Patient complains of right upper and lower quadrant abdominal pain with diarrh Previously  1.4 cm. Increased stranding in fluid within the root of the mesentery and in the region of the omentum. Small hiatal hernia. ABDOMINAL WALL:  No mass or hernia. URINARY BLADDER:  No visible focal wall thickening, lesion, or calculus.   PELVIC intravenous contrast material. Post contrast coronal MPR imaging was performed. Dose reduction techniques were used.  Dose information is transmitted to the [facility]) Mary Marks 35 ([address]) which includes the Dose Scattered small amount of ascites in the abdomen and pelvis. No free air. ABDOMINAL WALL:  Scattered soft tissue edema. There are changes of a previous vasectomy.   There is stable nonspecific nodular thickening along the bilateral spermatic cords with t nonspecific and could be postprocedural, with metastatic implants not entirely excluded. Clinical correlation recommended. 8. Scattered colonic diverticulosis.    Dictated by (CST): Laray Cogan, MD on 10/07/2020 at 4:35 PM     Finalized by (CST): Prudence Salazar drain placement 10/29 by IR     # NSVT / PVC  - noted AM 10/27, asymptomatic  - EKG with 's, avoid QT prolonging agents  - monitor on tele, replete lytes    #acute urinary retention  - straight cath x 1    - flomax started    # Hypokalemia  - replet

## 2020-10-30 NOTE — PLAN OF CARE
Patient A/O X 3. VSS. Up with assist. CARMITA X2 to bulb suction. Midline incision OT, no drainage, skin intact. Patient medicated with IV dilaudid PRN for pain. Patient receiving oral KCL for K 3.5 per protocol.      Problem: PAIN - ADULT  Goal: Verbalizes/disp resources  Description: INTERVENTIONS:  - Identify barriers to discharge w/pt and caregiver  - Include patient/family/discharge partner in discharge planning  - Arrange for needed discharge resources and transportation as appropriate  - Identify discharge METABOLIC/FLUID AND ELECTROLYTES - ADULT  Goal: Hemodynamic stability and optimal renal function maintained  Description: INTERVENTIONS:  - Monitor labs and assess for signs and symptoms of volume excess or deficit  - Monitor intake, output and patient reyna specific interventions  Outcome: Progressing     Problem: Safety Risk - Non-Violent Restraints  Goal: Patient will remain free from self-harm  Description: INTERVENTIONS:  - Apply the least restrictive restraint to prevent harm  - Notify patient and family

## 2020-10-30 NOTE — PROGRESS NOTES
** I AGREE WITH CHARTING OF FILOMENA ALEXIS RN.       0572: CALLED NAV HANNAH TO NOTIFY HER OF ATTEMPTING TO PLACE DOBHOFF X3 AND PATIENT PULLING IT OUT SAYING THAT HE CANNOT TOLERATE IT. DILAN STATED PATIENT NEEDS TO HAVE A GOALS OF CARE DISCUSSION W

## 2020-10-30 NOTE — PROGRESS NOTES
DR GARCÍA (INTERVENTIONAL RADIOLOGY) NOTIFIED THAT WHEN ATTEMPTING TO FLUSH THE CARMITA DRAIN INSERTED UNDER CT YESTERDAY, IT WAS MET WITH RESISTANCE. HE STATED TO KEEP WATCHING IT, AND TO NOTIFY HIM IF OUTPUT STOPS.     DR Hina Wright PAGED WITH NEW CONSULT THROUGH OFFI

## 2020-10-30 NOTE — PALLIATIVE CARE NOTE
Palliative Care    Palliative Care Consult request from [facility] PA noted requesting discussion for goals of care. Chart reviewed and noted visit with Scott Carlos last on 2/19/20 for [address] and ACP.  Note reflects that the sky

## 2020-10-30 NOTE — PROGRESS NOTES
INFECTIOUS DISEASE PROGRESS NOTE    Júnior Ventura Patient Status:  Inpatient    1950 MRN [id number]   [facility] 4SW-A Attending Janine Canas, *   Hosp Day # 8 PCP Rossi Candelaria Blood pressure (!) 142/91, pulse 67, temperature 98 °F (36.7 °C), temperature source Oral, resp. rate 15, height 5' 8\" (1.727 m), weight 158 lb 6.4 oz (71.8 kg), SpO2 93 %. HEENT: no scleral icterus or conjunctival injection. Moist mucous membranes.  No t Romeo Mendez, UROBILINOGEN, NITRITE, LEUUR, WBCUR, RBCUR, BACUR, EPIUR in the last 168 hours. Microbiology    Reviewed in EMR,   Hospital Encounter on 10/21/20   1.  AEROBIC BACTERIAL CULTURE     Status: None (Preliminary result)    Collection Time: 1 which is unchanged. BILIARY: There is gallbladder wall thickening which is a nonspecific finding in the setting of ascites and hepatic parenchymal disease. PANCREAS: No lesion, fluid collection, ductal dilatation, or atrophy.    SPLEEN: No enlargement o inferior tip right hepatic lobe that could be an abscess. 2. Diffuse hepatic metastases and diffuse metastases in lung bases. 3. Marked bowel wall thickening and mucosal hyper enhancement involving the ileum diffusely.  There is a right lower quadrant o

## 2020-10-30 NOTE — PROGRESS NOTES
[facility]  Progress Note    Joseph Hanson Patient Status:  Inpatient    1950 MRN [id number]   [facility] 4NW-A Attending Graciela Sultana, [address] [facility] Day # 8 PCP Malissa Cutler MD     Subjective:    Patient reports not malathi Leta Kimbrough, [address]  [address] [facility]  General Surgery  10/30/2020

## 2020-10-30 NOTE — DIETARY NOTE
1230 [facility] ASSESSMENT    Pt does not meet malnutrition criteria at this time.     NUTRITION DIAGNOSIS/PROBLEM:  Inadequate energy intake related to decreased ability to consume sufficient energy intake and physiologic causes incr appetite. Pt NPO x 2 days. Has NG to LIS. S/p ex lap, ileo-cecectomy, and ileostomy creation on 10/22. PMH: stage 4 rectal CA with mets to liver, lung, and lymph nodes, s/p chemo/radiation tx, recent admit for typhlitis.     ANTHROPOMETRICS:  Ht: 172.7 cm

## 2020-10-30 NOTE — PLAN OF CARE
A&Ox4, VSS, lungs clear bilaterally, diminished in bases. Adb is soft, non-tender, non-distended. Bowel sounds hypoactive. Patient encourage PO intake. Ileostomy is c/d/I,  draining greenish, mucous fluid.   Midline incision w/ conrad c/d/i, L CARIMTA drain ret assist with strengthening/mobility  - Encourage toileting schedule  Outcome: Progressing

## 2020-10-30 NOTE — PHYSICAL THERAPY NOTE
Attempted to see pt this AM. Politely refused and wanted to be seen later is able, stating that it has been a disappointing morning.  Educated on the importance of mobilities, in spite of and encourage to walk with staff or even motivate self just to get OO

## 2020-10-31 NOTE — PROGRESS NOTES
Pt resting in bed, easy non labored breathing. Vs wnl. cpox and telemetry in place. Pt tolerating small amount of clears. Bilateral scd's in place. Levin draining yellow urine. Iv fluids infusing without difficulty. Midline staples cecy, well approx. c/d/i.

## 2020-10-31 NOTE — CONSULTS
Hematology/Oncology Consult Note        NAME: Mirtha Cruz - ROOM: [address] [place] [address]- - MRN: [id number] - Age: 78year old - : 1950    Reason for Consult: srage IV rectal cancer, poor po    Patient is a 78 y.o male, patient of DF who is currently admitted Pulse 69   Temp 98.1 °F (36.7 °C) (Oral)   Resp 15   Ht 1.727 m (5' 8\")   Wt 71.8 kg (158 lb 6.4 oz)   SpO2 97%   BMI 24.08 kg/m²   Physical Exam:   General: alert, cooperative, no respiratory distress.    Head: Normocephalic, without obvious abnormality, Oncology

## 2020-10-31 NOTE — PROGRESS NOTES
INFECTIOUS DISEASE PROGRESS NOTE    Sydnee Joel Patient Status:  Inpatient    1950 MRN [id number]   [facility] 4SW-A Attending Shiraz Comer, *   Hosp Day # 9 PCP Melissa Tolentino (36.4 °C), temperature source Oral, resp. rate 16, height 5' 8\" (1.727 m), weight 158 lb 6.4 oz (71.8 kg), SpO2 97 %. HEENT: no scleral icterus or conjunctival injection. Moist mucous membranes. No thrush. Neck: No lymphadenopathy. Supple.   Respiratory Growth 2 Days N/A    Aerobic Smear No WBCs seen N/A    Aerobic Smear No organisms seen N/A   2. URINE CULTURE, ROUTINE     Status: None    Collection Time: 10/22/20  4:30 AM    Specimen: Urine, clean catch   Result Value Ref Range    Urine Culture No Growt AORTA/VASCULAR: Aorta is atherosclerotic but not aneurysmal.   RETROPERITONEUM: No mass or adenopathy. BOWEL/MESENTERY: There has been interval appendectomy. There is a right lower quadrant ileostomy evident.  There is marked bowel wall thickening and m with known history of rectal cancer. 5. Moderate bilateral pleural effusions have increased since previous study.        Dictated by (CST): Jackie Dangelo MD on 10/28/2020 at 2:53 PM   Finalized by (CST): Jackie Dangelo MD on 10/28/2020 at 3:01 PM       ASS

## 2020-10-31 NOTE — PLAN OF CARE
Pt alert/oriented x 4, fatigued. K 3.6 today. Complaints of pain to mid abdomen, dilaudid IV given with adequate control. RUQ ileostomy noted with dark black output. LLQ CARMITA drain noted, minimal output.   Levin in place, dark iris urine noted in adequat Modify environment to reduce risk of injury  - Provide assistive devices as appropriate  - Consider OT/PT consult to assist with strengthening/mobility  - Encourage toileting schedule  Outcome: Progressing     Problem: Altered Communication/Language Nick Whitfield bundle as indicated  Outcome: Progressing     Problem: GASTROINTESTINAL - ADULT  Goal: Maintains or returns to baseline bowel function  Description: INTERVENTIONS:  - Assess bowel function  - Maintain adequate hydration with IV or PO as ordered and tolerat

## 2020-10-31 NOTE — PHYSICAL THERAPY NOTE
Attempted to see Pt this PM - RN aware of attempt. Pt encouraged to ambulate in hallway or up to bathroom for sinkside ADL's. Pt polietly refused any OOB mobility. Will continue to follow.

## 2020-10-31 NOTE — PROGRESS NOTES
Wife at bedside    abd soft and non distended    Ostomy working well    He has pain while eating  Etiology unknown

## 2020-10-31 NOTE — PROGRESS NOTES
[facility] Hospitalist Progress Note     [facility]      SUBJECTIVE:  Continues to have pain, worse with eating  Patient couldn't tolerate dophoff yesterday    OBJECTIVE:  Temp:  [97.6 °F (36.4 °C)-98.1 °F (36.7 °C)] 97.6 °F (36.4 °C)  Pulse:  [67-78] 78 Lab 10/30/20  1220 10/30/20  1747 10/30/20  2137 10/31/20  0807 10/31/20  1231   PGLU 134* 102* 97 105* 123*       Imaging:  Xr Abdomen (1 View) (cpt=74018)    Result Date: 10/9/2020  PROCEDURE:  XR ABDOMEN (1 VIEW) (CPT=74018)  INDICATIONS:  abd pain an CONTRAST USED:  100cc of Omnipaque 350  FINDINGS:   Limited views of the chest demonstrate numerous areas of presumed pulmonary metastatic disease, stable from the prior exam.  Mild basilar atelectasis has slightly progressed.   Again noted is a large lesi small amount of intra-abdominal ascites which is new. Some areas of the colon demonstrate prominent wall thickening which have increased in prominence from the prior exam, particularly in the region of the distal sigmoid colon and rectum.   A nonspecific c performed from the dome of the diaphragm to the pubic symphysis with non-ionic intravenous contrast material. Post contrast coronal MPR imaging was performed. Dose reduction techniques were used.  Dose information is transmitted to the ACR ([employer] and duodenal bulb. Is there is no free air. There are numerous appendicoliths. There is increased distension of the appendix by fluid is maximum dimension distally was 0.9 cm presently 1.2 cm. Approximately 1.9 cm in thickness. Previously  1.4 cm.  Inc 10/7/2020  PROCEDURE:  CT ABDOMEN PELVIS IV CONTRAST, NO ORAL (ER)  COMPARISON:  Outside exam from 8/3/2020.   INDICATIONS:  abd pain  TECHNIQUE:  CT scanning was performed from the dome of the diaphragm to the pubic symphysis with non-ionic intravenous con recommended. There is mild to moderate thickening of the rectosigmoid colon with underlying nonspecific colitis and/or neoplasm not entirely excluded. Clinical correlation recommended. Scattered colonic diverticulosis. Scattered feces in the colon.   Sc previous vasectomy.   There is stable nonspecific nodular thickening along the bilateral spermatic cords with the largest focal area on the right measuring up to 3.1 x 1.9 cm in the largest focal area on the left measuring up to 1.9 x 1.7 cm which is nonspe with ileostomy  - in setting of recent admission for typhlitis  - surgery and ID on consult- appreciate  - IV abx changed from meropenem to ceftrixone/flagyl  - NG management per surgery -->removed 10/26  - on CLD  - repeat CT 10/28 with concern for absces

## 2020-11-01 PROCEDURE — 3E0G76Z INTRODUCTION OF NUTRITIONAL SUBSTANCE INTO UPPER GI, VIA NATURAL OR ARTIFICIAL OPENING: ICD-10-PCS | Performed by: INTERNAL MEDICINE

## 2020-11-01 NOTE — PLAN OF CARE
A&Ox4. VSS. RA. . Tele-NSR. Abdomen soft, tender a`t times. Nauseous at times. Given pain medicine to help pain, which patient stated helped nausea. Midline with staples intact--no drainage. Passing gas via ostomy. Bowel sounds hypoactive.   Ileostomy  Modify environment to reduce risk of injury  - Provide assistive devices as appropriate  - Consider OT/PT consult to assist with strengthening/mobility  - Encourage toileting schedule  Outcome: Progressing     Problem: DISCHARGE PLANNING  Goal: Discharge t tolerated  - Evaluate effectiveness of GI medications  - Encourage mobilization and activity  - Obtain nutritional consult as needed  - Establish a toileting routine/schedule  - Consider collaborating with pharmacy to review patient's medication profile  O Goal  Description: Patient's Long Term Goal:  No post op complications so he can be downgraded to the floor and eventually discharge home. Interventions:  Monitor for any s/s of infection and report. Continue IVF for hydration.  Continue antibiotics to

## 2020-11-01 NOTE — PROGRESS NOTES
Assumed care of patient at 1500. Patient is alert and oriented x3-4, sometimes confused. On room air. Levin draining clear, yellow urine. IVF per order. Accessed port for TPN to start tonight. Ostomy draining brown, mucus stool. Midline incision is CDI.  VS

## 2020-11-01 NOTE — PROGRESS NOTES
[facility] Hospitalist Progress Note     [facility]      SUBJECTIVE:  No appetite  Only sips with pills this AM  Afebrile  CARMITA drain removed    OBJECTIVE:  Temp:  [97.6 °F (36.4 °C)-98.2 °F (36.8 °C)] 98.2 °F (36.8 °C)  Pulse:  [65-78] 68  Resp:  [13-19] 15 hours.    Invalid input(s): ALPHOS, TBIL, DBIL, TPROT    Recent Labs   Lab 10/31/20  0807 10/31/20  1231 10/31/20  1625 10/31/20  2127 11/01/20  0725   PGLU 105* 123* 100* 102* 103*       Imaging:  Xr Abdomen (1 View) (cpt=74018)    Result Date: 10/9/2020 by Technologist)  Patient has bacteremia with history of rectal cancer.    CONTRAST USED:  100cc of Omnipaque 350  FINDINGS:   Limited views of the chest demonstrate numerous areas of presumed pulmonary metastatic disease, stable from the prior exam.  Mild There has been interval increase in distention of the small bowel with a small amount of intra-abdominal ascites which is new.   Some areas of the colon demonstrate prominent wall thickening which have increased in prominence from the prior exam, particular abdominal pain, history of rectal cancer. TECHNIQUE:  CT scanning was performed from the dome of the diaphragm to the pubic symphysis with non-ionic intravenous contrast material. Post contrast coronal MPR imaging was performed.   Dose reduction techniques wall thickening involving the stomach and duodenum especially the antrum and duodenal bulb. Is there is no free air. There are numerous appendicoliths.   There is increased distension of the appendix by fluid is maximum dimension distally was 0.9 cm prese Ct Abdomen Pelvis Iv Contrast, No Oral (er)    Result Date: 10/7/2020  PROCEDURE:  CT ABDOMEN PELVIS IV CONTRAST, NO ORAL (ER)  COMPARISON:  Outside exam from 8/3/2020.   INDICATIONS:  abd pain  TECHNIQUE:  CT scanning was performed from the dome of the appendicitis are not entirely excluded. Clinical correlation recommended. There is mild to moderate thickening of the rectosigmoid colon with underlying nonspecific colitis and/or neoplasm not entirely excluded. Clinical correlation recommended.   Scatte of abdominal and pelvic ascites. 7. There are changes of a previous vasectomy.   There is stable nonspecific nodular thickening along the bilateral spermatic cords with the largest focal area on the right measuring up to 3.1 x 1.9 cm in the largest focal a ex lap with cecetomy with ileostomy  - in setting of recent admission for typhlitis  - surgery and ID on consult- appreciate  - IV abx changed from meropenem to ceftrixone/flagyl  - NG management per surgery -->removed 10/26  - on CLD  - repeat CT 10/28 wi

## 2020-11-01 NOTE — CONSULTS
[facility]  Report of Consultation    Luis E Gonzales Patient Status:  Inpatient    1950 MRN [id number]   [facility] 4NW-A Attending Hollis Cuevas, [address] [facility] Day # 10 PCP Leon Payne MD     Reason for Consultation:  [place] Thrombocytopenia (HCC)     General weakness     Abdominal pain, generalized     Colitis     Hyponatremia     LETY (acute kidney injury) ([facility] [address]     Dehydration     Acute appendicitis, unspecified acute appendicitis type        History:  Past Medical History: injection 10 mg, 10 mg, Intravenous, Q6H PRN    •  Enoxaparin Sodium (LOVENOX) 40 MG/0.4ML injection 40 mg, 40 mg, Subcutaneous, Nightly        No current facility-administered medications on file prior to encounter.      •  simethicone 80 MG Oral Chew Tab, GENERAL: age appropriate.  NAD, non toxic appearing, sitting comfortably watching football  HEENT: normocephalic, mucous membranes moist.  EYES: eomi   NECK:non tender, supple  RESPIRATORY: clear to percussion and auscultation  CARDIOVASCULAR: reg rate contrast material. Post contrast coronal MPR imaging was performed. Dose reduction techniques were used.  Dose information is   transmitted to the  [facility]) NRDR ([address]) which includes the Dose Index Reg pelvis. ABDOMINAL WALL:  There is postoperative change in the ventral abdominal wall from the recent appendectomy. URINARY BLADDER:  There is a Levin catheter in the bladder. PELVIC NODES:  No adenopathy. PELVIC ORGANS:  No visible mass.   Pelvic orga ...    --start ppi   --ascites noted on last ct , no plans for peg w/ ascites  --can attempt egd tomorrow w/ Dr Burnham Route. If esophagitis is present, treating this may help.   Otherwise, perhaps can attempt dobhoff/ng tube placement while sedated to see if he

## 2020-11-01 NOTE — PHYSICAL THERAPY NOTE
PHYSICAL THERAPY TREATMENT NOTE - INPATIENT    Room Number: 403/403-A     Session: 1  Number of Visits to Meet Established Goals: 6    Presenting Problem: acute appendicitis w/perforation and peritonitis, s/p ex lap with ileo-cecectomy and creation of ile abdomen  Management Techniques: Activity promotion; Body mechanics;Repositioning; Other (Comment)(coordinate w/pain meds)    BALANCE                                                                                                                     Static Si with pt importance ot be upright and encourage to walk again with nursing staff later to day and several times on a daily basis.  Nursing is aware of this visit    THERAPEUTIC EXERCISES  Lower Extremity Ankle pumps  Glut sets  Hip AB/AD  Heel slides  Quad s All goals ongoing 11/1/2020

## 2020-11-01 NOTE — PROGRESS NOTES
Hematology/Oncology follow up note        NAME: Horace Quintana - ROOM: 507/736-U - MRN: [id number] - Age: 78year old - : 1950    No events overnight     HPI   Patient is a 78 y.o male, patient of DF who is currently admitted s/p exlap with cecectom SpO2 100%   BMI 24.48 kg/m²   Physical Exam:   General: alert, cooperative, no respiratory distress. Head: Normocephalic, without obvious abnormality, atraumatic. Lungs: no respiratory distress    Heart: Regular rate and rhythm, normal S1S2, no murmur. participate in the care of this patient, please call with any questions.     Kailey Figueroa M.D.  [facility]

## 2020-11-01 NOTE — PROGRESS NOTES
Wife at bedside    abd remains soft and non tender    Ostomy functioning well    He c/o pain shortly after drinking liquids, I do not think he is obstructed    rec GI eval  Consider egd for diagnosis

## 2020-11-01 NOTE — DIETARY NOTE
[facility]    NUTRITION FOLLOW UP ASSESSMENT    NUTRITION DIAGNOSIS/PROBLEM:  Inadequate energy intake related to decreased ability to consume sufficient energy intake and physiologic causes increasing nutrient needs (CA, s/p GI surgery) as evidenced Clear Liquid diet. Receiving IVF:D5-NS at 100 ml/hr, which provides: 408 kcal.     10/28 - NG removed 10/26. Diet advanced to clear liquids 10/27. Tolerating well per RN. Consumed 30% of 1 recorded meal. Pt states appetite is poor.  Experiencing some nausea focused physical exam at this time.      NUTRITION PRESCRIPTION: 73 kg  Calories: 9739-1761 calories/day (30-33 calories per kg)  Protein:  grams protein/day (1.25-1.5 grams protein per kg)  Fluid: ~1 ml/kcal or per MD discretion    ISABEL AND Anjana Walker

## 2020-11-01 NOTE — PLAN OF CARE
A/O. [place] [address]. Flat affect. Appears depressed. Weaned to RA. IS encouraged. NSR on tele. SCDs. Ileostomy with green brown liquid stool. Hypoactive bowel sounds. Poor appetite. Pt states abdominal pain with eating and drinking.  Tolerated water and am medic Verbalizes/displays adequate comfort level or patient's stated pain goal  Description: INTERVENTIONS:  - Encourage pt to monitor pain and request assistance  - Assess pain using appropriate pain scale  - Administer analgesics based on type and severity of

## 2020-11-02 ENCOUNTER — ANESTHESIA EVENT (OUTPATIENT)
Dept: ENDOSCOPY | Facility: HOSPITAL | Age: 70
DRG: 853 | End: 2020-11-02
Payer: COMMERCIAL

## 2020-11-02 ENCOUNTER — ANESTHESIA (OUTPATIENT)
Dept: ENDOSCOPY | Facility: HOSPITAL | Age: 70
DRG: 853 | End: 2020-11-02
Payer: COMMERCIAL

## 2020-11-02 PROBLEM — Z71.89 COUNSELING REGARDING ADVANCE CARE PLANNING AND GOALS OF CARE: Status: ACTIVE | Noted: 2020-11-02

## 2020-11-02 PROBLEM — Z51.5 PALLIATIVE CARE ENCOUNTER: Status: ACTIVE | Noted: 2020-11-02

## 2020-11-02 PROCEDURE — 99252 IP/OBS CONSLTJ NEW/EST SF 35: CPT | Performed by: CLINICAL NURSE SPECIALIST

## 2020-11-02 PROCEDURE — 0DH68UZ INSERTION OF FEEDING DEVICE INTO STOMACH, VIA NATURAL OR ARTIFICIAL OPENING ENDOSCOPIC: ICD-10-PCS | Performed by: INTERNAL MEDICINE

## 2020-11-02 RX ORDER — SODIUM CHLORIDE, SODIUM LACTATE, POTASSIUM CHLORIDE, CALCIUM CHLORIDE 600; 310; 30; 20 MG/100ML; MG/100ML; MG/100ML; MG/100ML
INJECTION, SOLUTION INTRAVENOUS CONTINUOUS
Status: DISCONTINUED | OUTPATIENT
Start: 2020-11-02 | End: 2020-11-02

## 2020-11-02 RX ORDER — LIDOCAINE HYDROCHLORIDE 10 MG/ML
INJECTION, SOLUTION EPIDURAL; INFILTRATION; INTRACAUDAL; PERINEURAL AS NEEDED
Status: DISCONTINUED | OUTPATIENT
Start: 2020-11-02 | End: 2020-11-02 | Stop reason: SURG

## 2020-11-02 RX ORDER — NALOXONE HYDROCHLORIDE 0.4 MG/ML
80 INJECTION, SOLUTION INTRAMUSCULAR; INTRAVENOUS; SUBCUTANEOUS AS NEEDED
Status: ACTIVE | OUTPATIENT
Start: 2020-11-02 | End: 2020-11-03

## 2020-11-02 RX ADMIN — LIDOCAINE HYDROCHLORIDE 50 MG: 10 INJECTION, SOLUTION EPIDURAL; INFILTRATION; INTRACAUDAL; PERINEURAL at 15:20:00

## 2020-11-02 NOTE — PHYSICAL THERAPY NOTE
IP PT attempted, pt refusing. Spouse present. Educated pt on role of PT and benefits of OOB mobility. Per spouse, pt does not \"like to work with PT\". Pt states he is moving , however, is currently refusing skilled PT to sign off.  Will re-attempt as sched

## 2020-11-02 NOTE — OPERATIVE REPORT
.[facility] Patient Status:  Inpatient    1950 MRN [id number]   [facility] ENDOSCOPY Attending Serge Lo MD   [facility] Day # 11 PCP Leon Payne MD         PATIENT NAME: Sulema Pugh OF OPERATIO

## 2020-11-02 NOTE — ANESTHESIA POSTPROCEDURE EVALUATION
[facility] Patient Status:  Inpatient   Age/Gender 78year old male MRN [id number]   Location [address] [facility]. Attending Hayden Lyman MD   [facility] Day # 11 PCP Carin Acosta MD       Anesthesia Post-op Note    Procedu

## 2020-11-02 NOTE — PROGRESS NOTES
INFECTIOUS DISEASE PROGRESS NOTE    Jarek Jackson Patient Status:  Inpatient    1950 MRN [id number]   [facility] 4SW-A Attending Princess Zapata, *   Hosp Day # 11 KACI Gutierrez comfortably in bed on room air. Vital signs: Blood pressure 111/76, pulse 73, temperature 98.1 °F (36.7 °C), temperature source Oral, resp. rate 14, height 5' 8\" (1.727 m), weight 161 lb (73 kg), SpO2 94 %.   HEENT: no scleral icterus or conjunctival inje Smear No WBCs seen N/A    Aerobic Smear No organisms seen N/A   2. URINE CULTURE, ROUTINE     Status: None    Collection Time: 10/22/20  4:30 AM    Specimen: Urine, clean catch   Result Value Ref Range    Urine Culture No Growth at 18-24 hrs.  N/A       Rad but not aneurysmal.   RETROPERITONEUM: No mass or adenopathy. BOWEL/MESENTERY: There has been interval appendectomy. There is a right lower quadrant ileostomy evident.  There is marked bowel wall thickening and mucosal hyperenhancement involving the ileum Moderate bilateral pleural effusions have increased since previous study. Dictated by (CST): Osmel Garcia MD on 10/28/2020 at 2:53 PM   Finalized by (CST): Osmel Garcia MD on 10/28/2020 at 3:01 PM       ASSESSMENT:  1.  Acute appendicitis with perit

## 2020-11-02 NOTE — ANESTHESIA PREPROCEDURE EVALUATION
PRE-OP EVALUATION    Patient Name: Leyla Hagan    Pre-op Diagnosis: Appendicitis Laurent Sykes    Procedure(s):  LAPAROSCOPIC APPENDECTOMY    Surgeon(s) and Role:     Celina Ling MD - Primary    Pre-op vitals reviewed.   Temp: 97.1 °F (36.2 °C)  Pulse: 75 daily for 14 days. , Disp: 42 tablet, Rfl: 0    •  sodium chloride 1 g Oral Tab, Take 1 tablet (1 g total) by mouth daily. , Disp: 30 tablet, Rfl: 0    •  Potassium Chloride ER 20 MEQ Oral Tab CR, Take 1 tablet (20 mEq total) by mouth 2 (two) times daily. , D MCH 29.2 11/02/2020    MCH 32.7 09/28/2020    MCHC 32.0 11/02/2020    MCHC 33.7 09/28/2020    RDW 15.8 (H) 11/02/2020    RDW 14.6 09/28/2020    .0 11/02/2020     (L) 09/28/2020     Lab Results   Component Value Date     (L) 11/02/2020

## 2020-11-02 NOTE — PROGRESS NOTES
[facility]  Progress Note    Dwayne Oneil Patient Status:  Inpatient    1950 MRN [id number]   [facility] 4NW-A Attending Dann Bowman MD   [facility] Day # 6 PCP Honorio Louise MD     Subjective:    Recovering from surgery RDW-SD 51.3 (H) 35.1 - 46.3 fL    Neutrophil Absolute Prelim 10.31 (H) 1.50 - 7.70 x10 (3) uL    Neutrophil Absolute 10.31 (H) 1.50 - 7.70 x10(3) uL    Lymphocyte Absolute 1.01 1.00 - 4.00 x10(3) uL    Monocyte Absolute 0.99 0.10 - 1.00 x10(3) uL    Eos 7.1 (L) 13.0 - 17.5 g/dL    HCT 22.2 (L) 39.0 - 53.0 %    .0 150.0 - 450.0 10(3)uL    MCV 91.4 80.0 - 100.0 fL    MCH 29.2 26.0 - 34.0 pg    MCHC 32.0 31.0 - 37.0 g/dL    RDW 15.8 (H) 11.0 - 15.0 %    RDW-SD 52.4 (H) 35.1 - 46.3 fL    Neutrophil Abs

## 2020-11-02 NOTE — DIETARY NOTE
Clinical Nutrition Note     Consulted for TF recommendations -- see below. Pt on TPN, tonight's bag to meet 50% of needs. Pt underwent EGD with NG placement today. See full assessment 11/1. NUTRITION INTERVENTION:  Enteral Nutrition - Recommend Vital 1.

## 2020-11-02 NOTE — PROGRESS NOTES
[facility] Hospitalist Progress Note     [facility]      SUBJECTIVE:  No acute events  Continue to have abdominal pain, even without PO intake -- had dilaudid and zofran this morning  Wife at bedside this afternoon    OBJECTIVE:  Temp:  [97.1 °F (36.2 °C)- results for input(s): ALT, AST, ALB, AMYLASE, LIPASE, LDH in the last 168 hours.     Invalid input(s): ALPHOS, TBIL, DBIL, TPROT    Recent Labs   Lab 11/01/20  1313 11/01/20  1626 11/01/20  2046 11/02/20  0556 11/02/20  1225   PGLU 106* 116* 118* 136* 126* includes the Dose Index Registry. PATIENT STATED HISTORY:(As transcribed by Technologist)  Patient has bacteremia with history of rectal cancer.    CONTRAST USED:  100cc of Omnipaque 350  FINDINGS:   Limited views of the chest demonstrate numerous areas of Please refer to prior study for further details. CONCLUSION:  There has been interval increase in distention of the small bowel with a small amount of intra-abdominal ascites which is new.   Some areas of the colon demonstrate prominent wall thic ABDOMEN+PELVIS(CONTRAST ONLY)(CPT=74177), 10/12/2020, 8:21 PM.  INDICATIONS:  abdominal pain, history of rectal cancer.   TECHNIQUE:  CT scanning was performed from the dome of the diaphragm to the pubic symphysis with non-ionic intravenous contrast materia bowel diffusely from terminal ileum to the level the jejunum. There is some wall thickening involving the stomach and duodenum especially the antrum and duodenal bulb. Is there is no free air. There are numerous appendicoliths.   There is increased diste Finalized by (CST): Bedelia Claude, MD on 10/21/2020 at 11:48 PM       Ct Abdomen Pelvis Iv Contrast, No Oral (er)    Result Date: 10/7/2020  PROCEDURE:  CT ABDOMEN PELVIS IV CONTRAST, NO ORAL (ER)  COMPARISON:  Outside exam from 8/3/2020.   INDICATI measures up to 1.3 cm in diameter. An underlying mucocele or acute appendicitis are not entirely excluded. Clinical correlation recommended.   There is mild to moderate thickening of the rectosigmoid colon with underlying nonspecific colitis and/or neopla pulmonary metastatic disease again noted. 6. Scattered small amount of abdominal and pelvic ascites. 7. There are changes of a previous vasectomy.   There is stable nonspecific nodular thickening along the bilateral spermatic cords with the largest focal admitted for worsening abdominal pain.      # Sepsis (resolved)secondary to acute appendicitis with perforation and peritonitis s/p ex lap with cecetomy with ileostomy  - in setting of recent admission for typhlitis  - surgery and ID on consult- appreciate

## 2020-11-02 NOTE — PLAN OF CARE
Pt A/O x4. Vitals stable. Afebrile. Pt c/o abdominal pain, PRN meds per MAR. TPN infusing through [place]. IV fluids stopped when TPN started. IV abx per MAR. Ileostomy draining dark brown liquid stool. Midline abdominal incision C,D,I.  Levin draining adequat Consider OT/PT consult to assist with strengthening/mobility  - Encourage toileting schedule  Outcome: Progressing     Problem: DISCHARGE PLANNING  Goal: Discharge to home or other facility with appropriate resources  Description: INTERVENTIONS:  - Identif activity  - Obtain nutritional consult as needed  - Establish a toileting routine/schedule  - Consider collaborating with pharmacy to review patient's medication profile  Outcome: Progressing     Problem: METABOLIC/FLUID AND ELECTROLYTES - ADULT  Goal: Hem Goal  Description: Patient's Short Term Goal:  Pain will be controlled. Interventions:   Monitor pain, follow pain protocol. Keep a restful environment for sleep.     - See additional Care Plan goals for specific interventions  Outcome: Progressing

## 2020-11-02 NOTE — DIETARY NOTE
[facility]  Clinical Nutrition - TPN Follow-up    CHARIS Tejeda's TPN to provide 580 dextrose calories, 300 lipid calories 27%, 55g AA in 2000ml to provide 1100 total calories, 50% minimum calorie needs, 50% protein needs.  Electrolytes adju

## 2020-11-02 NOTE — PLAN OF CARE
Problem: PAIN - ADULT  Goal: Verbalizes/displays adequate comfort level or patient's stated pain goal  Description: INTERVENTIONS:  - Encourage pt to monitor pain and request assistance  - Assess pain using appropriate pain scale  - Administer analgesics surrounding tissue  - Implement wound care per orders  - Initiate isolation precautions as appropriate  - Initiate Pressure Ulcer prevention bundle as indicated  Outcome: Progressing   Midline abd incision c/d/I. Well approximated. Staples intact.  No surro

## 2020-11-02 NOTE — PROGRESS NOTES
Patient off the floor for EGD, will f/u with patient tomorrow  Callie Zacarais PA-C  [address] [place] [address]

## 2020-11-02 NOTE — CONSULTS
Joe  [id number]  Hospital Day #11  Date of Consult:  11/2/2020  Patient seen at: [facility]    Reason for Consultation:      Consult requested by Dawit CHOPRA for evaluatio episode of esophageal spasm at home where he couldn't breathe after a chemo tx and they had to call 911 which was very frightening to him. He acknowledges that the fear of this occurring again may be contributing to his lack of desire to eat/drink.     Pain cefTRIAXone Sodium (ROCEPHIN) 2 g in sodium chloride 0.9% 100 mL MBP/ADD-vantage, 2 g, Intravenous, Q24H    •  metRONIDAZOLE in NaCl (FLAGYL) IVPB premix 500 mg, 500 mg, Intravenous, Q8H    •  dextrose 5 % and 0.9 % NaCl infusion, , Intravenous, Continuous effort. Neurologic: Drowsy, slow to respond at times with difficulty word finding, oriented to person, place and time    Psychiatric: Mood; no agitation or anxiety   Skin: pale, warm and dry.     Palliative Performance Scale :  60%    Palliative Performanc Chica discussed his previous cancer treatments and the side effects with his appetite and decreased PO and lack of desire to eat.  They are awaiting the EGD today to provide more information that may identify an etiology regarding his aversion/difficulty and C in light of our discussion above and they would like to re-do the document. They were coming to get him at this time for EGD so final discussion and completion of POLST deferred to tomorrow. Previous document shredded.      HCPOA: Document from 2020 b DNAR/Selective confirmed  3. POLST: See above; plan to create new document tomorrow   4. Healthcare POA: spouse Bernardo Gomes; indicates wishes for QOL vs quantity; document scanned to EMR along with 5 Wishes that was previously created as OP.    5. Disposition:

## 2020-11-03 PROCEDURE — 99233 SBSQ HOSP IP/OBS HIGH 50: CPT | Performed by: CLINICAL NURSE SPECIALIST

## 2020-11-03 PROCEDURE — 30233N1 TRANSFUSION OF NONAUTOLOGOUS RED BLOOD CELLS INTO PERIPHERAL VEIN, PERCUTANEOUS APPROACH: ICD-10-PCS | Performed by: SURGERY

## 2020-11-03 RX ORDER — POTASSIUM CHLORIDE 1.5 G/1.77G
40 POWDER, FOR SOLUTION ORAL EVERY 4 HOURS
Status: COMPLETED | OUTPATIENT
Start: 2020-11-03 | End: 2020-11-03

## 2020-11-03 RX ORDER — MIRTAZAPINE 15 MG/1
15 TABLET, FILM COATED ORAL NIGHTLY
Status: DISCONTINUED | OUTPATIENT
Start: 2020-11-03 | End: 2020-11-10

## 2020-11-03 NOTE — PROGRESS NOTES
[address] Follow Up    Javier Hernandez  [id number]  Patient seen at: [facility]    Subjective:      Drowsy after receiving IV dilaudid. Pain RLQ with palpation only otherwise denies pain or nausea.      Patient seen and injection 40 mg, 40 mg, Subcutaneous, Nightly  No current outpatient medications on file.     Labs/ imaging     Hematology:  Lab Results   Component Value Date    WBC 11.2 (H) 11/03/2020    HGB 6.8 (LL) 11/03/2020    HCT 21.0 (L) 11/03/2020    .0 11/ disease. There is also nonspecific ascites,   overall small in amount and similar to the prior, mostly along paracolic gutter. The catheter was then injected with nonionic contrast.  This confirms a collapsed cavity. No evidence of fistula.   The inab Normal or  Reduced Full   70 Reduced Some disease  Can't perform job Full Normal or   Reduced Full   60 Reduced Significant disease  Can't perform hobby Occasional  Assist Normal or   Reduced Full or confused   50 Mainly sit/lie Extensive Disease  Can't do [facility])     Malignant neoplasm metastatic to left lung (HCC)     Chemotherapy induced neutropenia (HCC)     Thrombocytopenia (HCC)     General weakness     Abdominal pain, generalized     Colitis     Hyponatremia     LETY (acute kidney injury) ([facility] [address]     Dehyd

## 2020-11-03 NOTE — PLAN OF CARE
Pt A/O x4. Vitals stable. Afebrile. On 2L O2. NG tube in place, tube feedings infusing per orders. Pt c/o pain to nose where tube was placed. Site CDI, PRN medications per MAR. Port with good blood return. TPN infusing through PAC. Pt kept NPO.  Mouth swabs environment to reduce risk of injury  - Provide assistive devices as appropriate  - Consider OT/PT consult to assist with strengthening/mobility  - Encourage toileting schedule  Outcome: Progressing     Problem: DISCHARGE PLANNING  Goal: Discharge to home tolerated  - Evaluate effectiveness of GI medications  - Encourage mobilization and activity  - Obtain nutritional consult as needed  - Establish a toileting routine/schedule  - Consider collaborating with pharmacy to review patient's medication profile  O for specific interventions  Outcome: Progressing  Goal: Patient/Family Short Term Goal  Description: Patient's Short Term Goal:  Pain will be controlled. Interventions:   Monitor pain, follow pain protocol. Keep a restful environment for sleep.     - Se

## 2020-11-03 NOTE — PROGRESS NOTES
[facility]  Progress Note    Zeynep Watt Patient Status:  Inpatient    1950 MRN [id number]   [facility] 4NW-A Attending Carlita Arambula MD   [facility] Day # 12 PCP Astrid Red MD     Subjective:    Patient underwent EGD yes tube feeds: discussed with patient will start to increase tube feeds as dietary recs, will decrease TPN  If tolerating tube feeds will start PO diet- discussed with Arpanchitol Handler dietician today  hgb 6.8 1 unit PRBC ordered by hem  Palliative care following  Hem/o

## 2020-11-03 NOTE — DIETARY NOTE
[facility]    NUTRITION FOLLOW UP ASSESSMENT    NUTRITION DIAGNOSIS/PROBLEM:  Inadequate energy intake related to decreased ability to consume sufficient energy intake and physiologic causes increasing nutrient needs (CA, s/p GI surgery) as evidenced for TF. Pt with poor PO intakes for the past ~10 days. C/o abdominal pain and pain with drinking. GI to eval for possible esophagitis/stricture. Pt not ready for hospice yet. Possible plan for PEG placement for long term nutrition per Oncology note.  Receiv 0600 68.9 kg (151 lb 14.4 oz)     Wt Readings from Last 10 Encounters:  11/01/20 : 73 kg (161 lb)  10/07/20 : 63.5 kg (140 lb)  09/28/20 : 67.6 kg (149 lb 1.6 oz)  08/17/20 : 67 kg (147 lb 12.8 oz)  08/12/20 : 65.8 kg (145 lb)  08/03/20 : 67.8 kg (149 lb 6

## 2020-11-03 NOTE — PROGRESS NOTES
[facility] Hospitalist Progress Note     [facility]      SUBJECTIVE:  No acute events  Tolerated TF overnight at 20 cc/hr    OBJECTIVE:  Temp:  [97.1 °F (36.2 °C)-98.7 °F (37.1 °C)] 98.4 °F (36.9 °C)  Pulse:  [68-78] 78  Resp:  [12-18] 17  BP: (111-153)/(7 input(s): ALPHOS, TBIL, DBIL, TPROT    Recent Labs   Lab 11/02/20  1225 11/02/20  1804 11/03/20  0014 11/03/20  0611 11/03/20  1200   PGLU 126* 137* 136* 137* 137*       Imaging:  Xr Abdomen (1 View) (cpt=74018)    Result Date: 10/9/2020  PROCEDURE:  XR AB Patient has bacteremia with history of rectal cancer.    CONTRAST USED:  100cc of Omnipaque 350  FINDINGS:   Limited views of the chest demonstrate numerous areas of presumed pulmonary metastatic disease, stable from the prior exam.  Mild basilar atelectasi interval increase in distention of the small bowel with a small amount of intra-abdominal ascites which is new.   Some areas of the colon demonstrate prominent wall thickening which have increased in prominence from the prior exam, particularly in the regio history of rectal cancer. TECHNIQUE:  CT scanning was performed from the dome of the diaphragm to the pubic symphysis with non-ionic intravenous contrast material. Post contrast coronal MPR imaging was performed. Dose reduction techniques were used.  Dose involving the stomach and duodenum especially the antrum and duodenal bulb. Is there is no free air. There are numerous appendicoliths. There is increased distension of the appendix by fluid is maximum dimension distally was 0.9 cm presently 1.2 cm.   Ap Pelvis Iv Contrast, No Oral (er)    Result Date: 10/7/2020  PROCEDURE:  CT ABDOMEN PELVIS IV CONTRAST, NO ORAL (ER)  COMPARISON:  Outside exam from 8/3/2020.   INDICATIONS:  abd pain  TECHNIQUE:  CT scanning was performed from the dome of the diaphragm to t entirely excluded. Clinical correlation recommended. There is mild to moderate thickening of the rectosigmoid colon with underlying nonspecific colitis and/or neoplasm not entirely excluded. Clinical correlation recommended.   Scattered colonic diverticu pelvic ascites. 7. There are changes of a previous vasectomy.   There is stable nonspecific nodular thickening along the bilateral spermatic cords with the largest focal area on the right measuring up to 3.1 x 1.9 cm in the largest focal area on the left m appendicitis with perforation and peritonitis s/p ex lap with cecetomy with ileostomy  - in setting of recent admission for typhlitis  - surgery and ID on consult- appreciate  - IV abx changed from meropenem to ceftrixone/flagyl per ID recs, appreciated  - Medicine  [place] Hospitalist

## 2020-11-03 NOTE — CM/SW NOTE
Care Progression Note:  Active Acute Medical Issue:   LETY (acute kidney injury) ([facility] [address]     Other Contributing Medical Factors/Dx. : stage IV rectal cancer, liver metastasis,malignant neoplasm of right and left lung, HTN, typhilitis, sepsis- resolved- seconda

## 2020-11-03 NOTE — PROGRESS NOTES
[facility]  Progress Note    Geni Devries Patient Status:  Inpatient    1950 MRN [id number]   [facility] 4NW-A Attending Jessika Gaviria MD   [facility] Day # 12 PCP Zion Berumen MD     Subjective:  Geni Devrise is a(n) 78 Begin PO if pain improves. 3.   SBFT if not better    Lexi López  11/3/2020  7:26 AM

## 2020-11-03 NOTE — PROGRESS NOTES
[facility]  Progress Note    Liam Mccabe Patient Status:  Inpatient    1950 MRN [id number]   [facility] 4NW-A Attending Janeth Macedo MD   [facility] Day # 12 PCP Carlitos Pena MD     Subjective:    Recovering from surgery HGB 6.8 (LL) 13.0 - 17.5 g/dL    HCT 21.0 (L) 39.0 - 53.0 %    .0 150.0 - 450.0 10(3)uL    MCV 90.9 80.0 - 100.0 fL    MCH 29.4 26.0 - 34.0 pg    MCHC 32.4 31.0 - 37.0 g/dL    RDW 15.9 (H) 11.0 - 15.0 %    RDW-SD 51.8 (H) 35.1 - 46.3 fL    Neutrophi

## 2020-11-03 NOTE — PROGRESS NOTES
INFECTIOUS DISEASE PROGRESS NOTE    Aarti Dover Patient Status:  Inpatient    1950 MRN [id number]   [facility] 4SW-A Attending Kellie Blake, [facility] Day # 12 PCP Arn Captain pressure 127/87, pulse 71, temperature 98.7 °F (37.1 °C), temperature source Oral, resp. rate 13, height 5' 8\" (1.727 m), weight 161 lb (73 kg), SpO2 (!) 88 %. HEENT: no scleral icterus or conjunctival injection. Moist mucous membranes.  No thrush. + NG t organisms seen N/A   2. URINE CULTURE, ROUTINE     Status: None    Collection Time: 10/22/20  4:30 AM    Specimen: Urine, clean catch   Result Value Ref Range    Urine Culture No Growth at 18-24 hrs. N/A       Radiology: Reviewed.     PROCEDURE: CT ABDOMEN+ mass or adenopathy. BOWEL/MESENTERY: There has been interval appendectomy. There is a right lower quadrant ileostomy evident. There is marked bowel wall thickening and mucosal hyperenhancement involving the ileum diffusely.  This is most marked involving increased since previous study. Dictated by (CST): Geno Cai MD on 10/28/2020 at 2:53 PM   Finalized by (CST): Geno Cai MD on 10/28/2020 at 3:01 PM       ASSESSMENT:  1.  Acute appendicitis with peritonitis s/p exp lap with cecectomy and ileo

## 2020-11-04 RX ORDER — MAGNESIUM OXIDE 400 MG (241.3 MG MAGNESIUM) TABLET
400 TABLET ONCE
Status: COMPLETED | OUTPATIENT
Start: 2020-11-04 | End: 2020-11-04

## 2020-11-04 NOTE — PROGRESS NOTES
[facility]  Progress Note    Jay Jay José Patient Status:  Inpatient    1950 MRN [id number]   [facility] 4NW-A Attending Heather Garcia MD   [facility] Day # 15 PCP Ava Hurt MD     Subjective:    Patient tolerating tube need to be removed Friday   Encourage up in chair/ambulate  All questions answered    Jani Hammer 1163 Surgery  11/4/2020

## 2020-11-04 NOTE — PLAN OF CARE
A&Ox4. VSS. RA. . Tele-NSR. Abdomen soft, rounded. Patient jd passing gas. Nausea controlled with PRN zofran. Patient burping. Levin catheter draining clear yellow urine. Ileostomy draining green liquid stool. IV abx scheduled.  TPN running per ordere Instruct pt to call for assistance with activity based on assessment  - Modify environment to reduce risk of injury  - Provide assistive devices as appropriate  - Consider OT/PT consult to assist with strengthening/mobility  - Encourage toileting schedule function  - Maintain adequate hydration with IV or PO as ordered and tolerated  - Evaluate effectiveness of GI medications  - Encourage mobilization and activity  - Obtain nutritional consult as needed  - Establish a toileting routine/schedule  - Consider Problem: Patient/Family Goals  Goal: Patient/Family Long Term Goal  Description: Patient's Long Term Goal:  No post op complications so he can be downgraded to the floor and eventually discharge home.     Interventions:  Monitor for any s/s of infection a Progressing

## 2020-11-04 NOTE — DIETARY NOTE
[facility]    NUTRITION FOLLOW UP ASSESSMENT    NUTRITION DIAGNOSIS/PROBLEM:  Inadequate energy intake related to decreased ability to consume sufficient energy intake and physiologic causes increasing nutrient needs (CA, s/p GI surgery) as evidenced Tabatha Cortez, Surgery PA and with Dr. Janki Echavarria. Pt with normal EGD and NG placed on 11/2.     11/1- Received nutrition consult and call from Dr. Janki Echavarria to start TPN, d/t unable to place dobbhoff for TF. Pt with poor PO intakes for the past ~10 days.  C/o abdom kg/m².  IBW: 70 kg    WEIGHT HISTORY: Wt appears fairly stable. Noted no significant wt changes.     Patient Weight for the past 72 hrs:   Weight   10/22/20 1600 70.9 kg (156 lb 4.9 oz)   10/23/20 0600 68.9 kg (151 lb 14.4 oz)     Wt Readings from Last 10 E

## 2020-11-04 NOTE — PLAN OF CARE
Resting in bed at this time. Head of bed maintained above 90deg. Medicated w/ dialudid 0.8mg for c/o abdominal pain,will monitor relief. Tolerating TPN thru port as well as NG tube feeding. NO residuals,no nausea & vomiting noted.  Patient was started on mata [place] fall precautions as indicated by assessment.  - Educate pt/family on patient safety including physical limitations  - Instruct pt to call for assistance with activity based on assessment  - Modify environment to reduce risk of injury  - Provide a Problem: METABOLIC/FLUID AND ELECTROLYTES - ADULT  Goal: Hemodynamic stability and optimal renal function maintained  Description: INTERVENTIONS:  - Monitor labs and assess for signs and symptoms of volume excess or deficit  - Monitor intake, output and Discontinue any unnecessary medical devices as soon as possible  - Assess the patient's physical comfort, circulation, skin condition, hydration, nutrition and elimination needs   - Reorient and redirection as needed  - Assess for the need to continue rest

## 2020-11-04 NOTE — PROGRESS NOTES
[facility]  Progress Note    Rosemary Perez Patient Status:  Inpatient    1950 MRN [id number]   [facility] 4NW-A Attending Eileen Henning MD   [facility] Day # 15 PCP Taay Valles MD     Subjective:  Rosemary Perez is a(n) 78 po.      Johnnie Patrick  11/4/2020  7:28 AM

## 2020-11-04 NOTE — PLAN OF CARE
Seen by GI early this am.Woud like  Dietician to recommend starting w/ p.o. intake. Paged nutritionist/dietician. Awaiting for a call back. Tiffanie Streeter

## 2020-11-04 NOTE — PLAN OF CARE
Problem: SAFETY ADULT - FALL  Goal: Free from fall injury  Description: INTERVENTIONS:  - Assess pt frequently for physical needs  - Identify cognitive and physical deficits and behaviors that affect risk of falls.   - [facility] fall precautions as indica activity and pre-medicate as appropriate  Outcome: Not Progressing     Problem: DISCHARGE PLANNING  Goal: Discharge to home or other facility with appropriate resources  Description: INTERVENTIONS:  - Identify barriers to discharge w/pt and caregiver  - In partner in discharge planning  - Arrange for needed discharge resources and transportation as appropriate  - Identify discharge learning needs (meds, wound care, etc)  - Arrange for interpreters to assist at discharge as needed  - Consider post-discharge p

## 2020-11-04 NOTE — PROGRESS NOTES
[facility]  Progress Note    Júnior Ventura Patient Status:  Inpatient    1950 MRN [id number]   [facility] 4NW-A Attending Mil Sanchez MD   [facility] Day # 15 PCP Aurelia Wise MD     Subjective:    Recovering from surgery mg/dL   BASIC METABOLIC PANEL (8)    Collection Time: 11/04/20  5:03 AM   Result Value Ref Range    Glucose 120 (H) 70 - 99 mg/dL    Sodium 132 (L) 136 - 145 mmol/L    Potassium 3.9 3.5 - 5.1 mmol/L    Chloride 99 98 - 112 mmol/L    CO2 30.0 21.0 - 32.0 mm Chang Miles is a 78year old male     Acute appendicitis   S/p surgery and ileostomy   Nutrition   TPN      Rectal cancer history  Treatment as outpatient    Anemia   Hemoglobin has improved to 8.1  We will monitor counts      We will follow

## 2020-11-04 NOTE — PLAN OF CARE
Dietician spoke to Angel Brady to clarify diet,. She was told patient can start of w/ clear liquids then advance as tolerated.

## 2020-11-04 NOTE — CM/SW NOTE
Reviewed chart. Pt to wean off TPN and NG tube feedings increase. Per ID, pt will most likely need IV ABX for 2 weeks. SW to remain available and follow up if any other needs arise.

## 2020-11-04 NOTE — PROGRESS NOTES
[facility] Hospitalist Progress Note     [facility]      SUBJECTIVE:  No acute events  Tolerating TF, per GI ok to advance diet,  Afebrile  Denies abdominal pain     OBJECTIVE:  Temp:  [97.8 °F (36.6 °C)-98.5 °F (36.9 °C)] 97.9 °F (36.6 °C)  Pulse:  [90-82 11/04/20  0607   PGLU 137* 137* 137* 127* 138*       Imaging:Ct Abdomen+pelvis(contrast Only)(cpt=74177)    Result Date: 10/28/2020  CONCLUSION:  1. Interval appendectomy.   There is ascites diffusely in abdomen with a more focal peripherally enhancing flui Underlying appendicitis is suspected. Worsening small bowel wall thickening diffusely findings may represent enteritis , post treatment changes or may be related to hyperproteinemia.   Ischemia is unlikely since the celiac artery, SMA and ZACH are all widel ileostomy  - in setting of recent admission for typhlitis  - surgery and ID on consult- appreciate  - IV abx changed from meropenem to ceftrixone/flagyl per ID recs for 2 week course, appreciated  - NG management per surgery -->removed 10/26  - repeat CT 1

## 2020-11-04 NOTE — PROGRESS NOTES
INFECTIOUS DISEASE PROGRESS NOTE    Kp Baeza Patient Status:  Inpatient    1950 MRN [id number]   [facility] 4SW-A Attending Geovany Salcedo, [facility] Day # 12 PCP Arslan Vasquez on room air. Vital signs: Blood pressure 134/89, pulse 76, temperature 98.5 °F (36.9 °C), temperature source Oral, resp. rate 18, height 5' 8\" (1.727 m), weight 161 lb (73 kg), SpO2 93 %. HEENT: no scleral icterus or conjunctival injection.  Moist mucous Aerobic Smear No organisms seen N/A   2. URINE CULTURE, ROUTINE     Status: None    Collection Time: 10/22/20  4:30 AM    Specimen: Urine, clean catch   Result Value Ref Range    Urine Culture No Growth at 18-24 hrs. N/A       Radiology: Reviewed.     PA RETROPERITONEUM: No mass or adenopathy. BOWEL/MESENTERY: There has been interval appendectomy. There is a right lower quadrant ileostomy evident. There is marked bowel wall thickening and mucosal hyperenhancement involving the ileum diffusely.  This is pleural effusions have increased since previous study. Dictated by (CST): Jigar Black MD on 10/28/2020 at 2:53 PM   Finalized by (CST): Jigar Black MD on 10/28/2020 at 3:01 PM       ASSESSMENT:  1.  Acute appendicitis with peritonitis s/p exp lap

## 2020-11-04 NOTE — PHYSICAL THERAPY NOTE
PHYSICAL THERAPY TREATMENT NOTE - INPATIENT    Room Number: 403/403-A     Session: 2  Number of Visits to Meet Established Goals: 6    Presenting Problem: acute appendicitis w/perforation and peritonitis, s/p ex lap with ileo-cecectomy and creation of ile Bearing Restriction: None        PAIN ASSESSMENT   Rating: None stated  Location: abdomen  Management Techniques: Activity promotion; Body mechanics;Repositioning; Other (Comment)(coordinate w/pain meds)    BALANCE in bedside chair. Reviewed HEP. Pt left with call light in reach. RN aware.      THERAPEUTIC EXERCISES  Lower Extremity Ankle pumps  Glut sets  Hip AB/AD  Heel slides  Quad sets  SLR     Upper Extremity      Position Sitting and Supine     Repetitions   10

## 2020-11-05 PROCEDURE — 99232 SBSQ HOSP IP/OBS MODERATE 35: CPT | Performed by: CLINICAL NURSE SPECIALIST

## 2020-11-05 RX ORDER — POTASSIUM CHLORIDE 29.8 MG/ML
40 INJECTION INTRAVENOUS ONCE
Status: COMPLETED | OUTPATIENT
Start: 2020-11-05 | End: 2020-11-05

## 2020-11-05 RX ORDER — HYDROCODONE BITARTRATE AND ACETAMINOPHEN 10; 325 MG/1; MG/1
1 TABLET ORAL EVERY 4 HOURS PRN
Status: DISCONTINUED | OUTPATIENT
Start: 2020-11-05 | End: 2020-11-10

## 2020-11-05 NOTE — PLAN OF CARE
Patient somnolent. Arousable & responds appropriately when spoken to. Able to make needs known. Mostly quiet & sad when awake. Encouraged to increase orak intake,took 1/2 bottle boost & 3/4 cup magic cup. Maintained on NG tube feeding at 75c/hr. NO residuals

## 2020-11-05 NOTE — PROGRESS NOTES
18[address] Michael Child Follow Up    Perri Hi  [id number]  Patient seen at: [facility] day 15    Subjective:      States abdominal (generalized) pain is 5/10 and with IV dilaudid \"it goes down by 5 to 5\".  Neri MG/ML injection 0.4 mg, 0.4 mg, Intravenous, Q2H PRN **OR** HYDROmorphone HCl (DILAUDID) 1 MG/ML injection 0.8 mg, 0.8 mg, Intravenous, Q2H PRN  •  Labetalol HCl (TRANDATE) injection 10 mg, 10 mg, Intravenous, Q6H PRN  •  Enoxaparin Sodium (LOVENOX) 40 MG/ Time-out was performed by the staff. He was positioned supine. Preliminary scans were obtained.   This demonstrated targeted cavity to be collapsed, with no measurable residual.  There is still abnormality in the liver consistent with metastatic disea place, unable to state month, \"I had trouble with that this morning\". Confused conversation as noted above regarding pain assessment. Psychiatric: Mood flat  Skin: Pale, warm and dry.     Palliative Performance Scale: 50%    Palliative Performance Scale to DC but unable to do today due to confusion. HCPOA: Document on file.        Healthcare Agent Appointed: Yes  Healthcare Agent's Name: Myrtle Schlatter - spouse  Healthcare Agent's Phone Number: [phone]       Disposition: Will likely need GARY at follow. Above plan reviewed with Dr. Eric Aranda and the patient's nurse.      DIONICIO Cummings  Palliative Care   Phone: [phone]     11/5/2020  12:23 PM

## 2020-11-05 NOTE — PROGRESS NOTES
[facility]  Progress Note    Mirtha Cruz Patient Status:  Inpatient    1950 MRN [id number]   [facility] 4NW-A Attending Jaky Solorzano MD   [facility] Day # 15 PCP Edgardo Guerrier MD     Subjective:    Recovering from surgery 0.70 - 1.30 mg/dL    BUN/CREA Ratio 20.0 10.0 - 20.0    Calcium, Total 7.8 (L) 8.5 - 10.1 mg/dL    Calculated Osmolality 276 275 - 295 mOsm/kg    GFR, Non- 107 >=60    GFR, -American 124 >=60   CBC W/ DIFFERENTIAL    Collection Time:

## 2020-11-05 NOTE — PROGRESS NOTES
[facility]  Progress Note    Antwan Garay Patient Status:  Inpatient    1950 MRN [id number]   [facility] 4NW-A Attending Niyah Campuzano MD   [facility] Day # 15 PCP Lesly Kohler MD     Subjective:    Patient tolerating tube removing NG tube tomorrow  Staples to be removed tomorrow  Encourage up in chair/IS  All questions answered    Jani Billingsley 4446 Surgery  11/5/2020

## 2020-11-05 NOTE — PROGRESS NOTES
[facility] Hospitalist Progress Note     [facility]      SUBJECTIVE:  No acute events  TPN stopped, continues to tolerate TF  -monitor PO intake and if doing ok, then surgery will consider removing NGT tomorrow  Afebrile     -still taking IV dilaudid    OB 11/04/20  1723 11/05/20  0015 11/05/20  0604   PGLU 138* 109* 122* 122* 122*       Imaging:Ct Abdomen+pelvis(contrast Only)(cpt=74177)    Result Date: 10/28/2020  CONCLUSION:  1. Interval appendectomy.   There is ascites diffusely in abdomen with a more foc 4 mg, Intravenous, Q6H PRN    •  tamsulosin HCl (FLOMAX) cap 0.4 mg, 0.4 mg, Oral, Daily    •  melatonin tab TABS 2 mg, 2 mg, Oral, Nightly PRN    •  cefTRIAXone Sodium (ROCEPHIN) 2 g in sodium chloride 0.9% 100 mL MBP/ADD-vantage, 2 g, Intravenous, Q24H patient tolerating TF so far  - nutrition c/s for TPN  - advance TF to goal to make sure patient tolerating, and can wean TPN when TF advanced  - per GI able to advance PO, monitor  - add po norco for pain control, encourage pt to minimize narcotics   - an

## 2020-11-05 NOTE — DIETARY NOTE
[facility]    NUTRITION FOLLOW UP ASSESSMENT    NUTRITION DIAGNOSIS/PROBLEM:  Inadequate energy intake related to decreased ability to consume sufficient energy intake and physiologic causes increasing nutrient needs (CA, s/p GI surgery) as evidenced goal. Nutrition plan discussed with Keith Espinoza, Surgery PA and with Dr. Chikis Barrett. Pt with normal EGD and NG placed on 11/2.     11/1- Received nutrition consult and call from Dr. Chikis Barrett to start TPN, d/t unable to place dobbhoff for TF.  Pt with poor PO [place] of IBW  BMI: Body mass index is 25.88 kg/m². IBW: 70 kg    WEIGHT HISTORY: Wt appears fairly stable. Noted no significant wt changes.     Patient Weight for the past 72 hrs:   Weight   10/22/20 1600 70.9 kg (156 lb 4.9 oz)   10/23/20 0600 68.9 kg (151 lb 1

## 2020-11-05 NOTE — PROGRESS NOTES
INFECTIOUS DISEASE PROGRESS NOTE    Edson Maza Patient Status:  Inpatient    1950 MRN [id number]   [facility] 4SW-A Attending Mathieu Rice, *   [facility] Day # 14 PCP Lexii Guerra Exam:    General: No acute distress. Alert. Resting comfortably in bed on room air. Vital signs: Blood pressure 150/89, pulse 82, temperature 97.8 °F (36.6 °C), temperature source Oral, resp.  rate 14, height 5' 8\" (1.727 m), weight 170 lb 3.2 oz (77.2 kg Encounter on 10/21/20   1. AEROBIC BACTERIAL CULTURE     Status: None    Collection Time: 10/29/20  2:30 PM    Specimen: Abdominal fluid;  Abscess   Result Value Ref Range    Aerobic Culture Result No Growth 3 Days N/A    Aerobic Smear No WBCs seen N/A    A lesion, fluid collection, ductal dilatation, or atrophy. SPLEEN: No enlargement or focal lesion. KIDNEYS: No mass, obstruction, or calcification. ADRENALS: No mass or enlargement.    AORTA/VASCULAR: Aorta is atherosclerotic but not aneurysmal.   RETRO hyper enhancement involving the ileum diffusely. There is a right lower quadrant ostomy noted. This most likely represents diffuse enteritis. 4. Rectal wall thickening is consistent with known history of rectal cancer.    5. Moderate bilateral pleural eff

## 2020-11-05 NOTE — PLAN OF CARE
Pt Aox2. Confused and forgetful. TPN discontinued. Accucheck 122. Tube feeding continuous at goal rate through NG. Levin intact, draining yellow urine. Ostomy red and intact. Soft/loose brown output. Midline incision staples present, open to air.  No

## 2020-11-06 PROCEDURE — 99233 SBSQ HOSP IP/OBS HIGH 50: CPT | Performed by: CLINICAL NURSE SPECIALIST

## 2020-11-06 RX ORDER — MAGNESIUM SULFATE HEPTAHYDRATE 40 MG/ML
2 INJECTION, SOLUTION INTRAVENOUS ONCE
Status: COMPLETED | OUTPATIENT
Start: 2020-11-06 | End: 2020-11-06

## 2020-11-06 NOTE — CM/SW NOTE
MSW acknowledged hospice evaluation. PC consulted [facility]. SW to follow.     Lieutenant Jackson, [facility]

## 2020-11-06 NOTE — PROGRESS NOTES
[facility] Liaison met with wife and patient bedside and discussed hospice. Information provided and questions answered. Liaison provided wife and patient with contact phone number for future services if needed.

## 2020-11-06 NOTE — PROGRESS NOTES
Pt AOx2-3. Forgetful. RA. VSS. Pain meds given per MAR. Encouraged PO intake. Pt drank 25% of protein drink. Levin intact, draining yellow urine. No nausea/no vomiting. Continuous tube feedings. HOB at 30 degree.      0500 - Stoma pink and moist. Brown soft

## 2020-11-06 NOTE — PROGRESS NOTES
INFECTIOUS DISEASE PROGRESS NOTE    Dwayne Oneil Patient Status:  Inpatient    1950 MRN [id number]   [facility] 4SW-A Attending Prosper Alvarez, *   [facility]osp Day # 15 PCP Charla Bridges Resting comfortably in bed on room air. Vital signs: Blood pressure 114/68, pulse 71, temperature 98.1 °F (36.7 °C), temperature source Oral, resp. rate 16, height 5' 8\" (1.727 m), weight 170 lb 3.2 oz (77.2 kg), SpO2 91 %.   HEENT: no scleral icterus or Status: None    Collection Time: 10/29/20  2:30 PM    Specimen: Abdominal fluid;  Abscess   Result Value Ref Range    Aerobic Culture Result No Growth 3 Days N/A    Aerobic Smear No WBCs seen N/A    Aerobic Smear No organisms seen N/A   2. URINE CULTURE, R SPLEEN: No enlargement or focal lesion. KIDNEYS: No mass, obstruction, or calcification. ADRENALS: No mass or enlargement. AORTA/VASCULAR: Aorta is atherosclerotic but not aneurysmal.   RETROPERITONEUM: No mass or adenopathy.    BOWEL/MESENTERY: The a right lower quadrant ostomy noted. This most likely represents diffuse enteritis. 4. Rectal wall thickening is consistent with known history of rectal cancer. 5. Moderate bilateral pleural effusions have increased since previous study.        Dictated

## 2020-11-06 NOTE — PROGRESS NOTES
[facility]  Progress Note    Maria Dolores Mchugh Patient Status:  Inpatient    1950 MRN [id number]   [facility] 4NW-A Attending Padmaja Terry MD   [facility] Day # 13 PCP Linda Vuong MD     Subjective:    Patient tolerating minim IR drain of intra-abdominal fluid collection, fluid collection collapsed ir drain removed  Tolerating tube feeds    Plan:    Encourage PO diet, once tolerating would remove NG   Staples have been removed  Continue medical management  No further surgical ma

## 2020-11-06 NOTE — PROGRESS NOTES
[facility]  Progress Note    Jarek Manuel Patient Status:  Inpatient    1950 MRN [id number]   [facility] 4NW-A Attending Nir Ocampo MD   [facility] Day # 15 PCP Joellen Pedraza MD     Subjective:  Jarek Manuel is a(n) 78 Tolerating NGT feeds, started PO. Abd pain resolved      Plan:    1. Cont Abx, tube feeds.   2.  DC tube feeds when adequate po intake    Naima Jurado  11/6/2020  8:02 AM

## 2020-11-06 NOTE — BH LEVEL OF CARE ASSESSMENT
Level of Care Assessment Note    General Questions  Why are you here?: Pt states to get healthy  Precipitating Events: Referral for the liaison was placed due to possible depression.   History of Present Illness: 79 y/o male with a history of anxiety and de Harm Toward Animals: No  History or Allegations of Inappropriate Physical Contact: No  Have you ever damaged/destroyed property or thought about it?: No    Access to Means  Has access to means to attempt suicide or harm others or property: No  Access to Fi Psychiatrist  Name: psychiatrist  Current/Previous MH/CD Treatment  Recovery Support Groups: Denies Past History  History of Seclusion/Restraint: No    Alcohol Use  How often do you have a drink containing alcohol? : Currently abstinent but used to drink Affect: Appropriate to situation  Range of Affect: Flat  Stability of Affect: Stable  Attitude toward staff: Co-operative  Speech  Rate of Speech: Appropriate  Flow of Speech: Appropriate  Intensity of Volume: Soft  Clarity: Clear  Cognition  Concentration psychotherapist- JAMIE  Refused Treatment: No  Education Provided: Call 911 in an Emergency;[facility] Crisis Line Number;Advised to call if condition worsens; Advised to call with questions

## 2020-11-06 NOTE — PROGRESS NOTES
[facility] Hospitalist Progress Note     [facility]      SUBJECTIVE:  No acute events  TPN stopped, continues to tolerate TF  Not able to tolerate much po intake, denies increase in pain   Afebrile     Taking less dialudid than prior, trying use norco inst 11/06/20  0602 11/06/20  1220   PGLU 122* 121* 116* 123* 102*       Imaging:Ct Abdomen+pelvis(contrast Only)(cpt=74177)    Result Date: 10/28/2020  CONCLUSION:  1. Interval appendectomy.   There is ascites diffusely in abdomen with a more focal peripherally 4 mg, 4 mg, Intravenous, Q6H PRN    •  tamsulosin HCl (FLOMAX) cap 0.4 mg, 0.4 mg, Oral, Daily    •  melatonin tab TABS 2 mg, 2 mg, Oral, Nightly PRN    •  cefTRIAXone Sodium (ROCEPHIN) 2 g in sodium chloride 0.9% 100 mL MBP/ADD-vantage, 2 g, Intravenous, Pain  # Malnutrition  - continues to have pain with eating / drinking -- only able to do sips with meds  - discussed with Dr. Aneta Villaseñor this afternoon  - GI c/s for evaluation -- EGD 11/2 without clear etiology of pain.  NG placed with procedure and patien

## 2020-11-06 NOTE — PROGRESS NOTES
1808 [facility] Child Follow Up    Horace Quintana  [id number]  Patient seen at: [facility] Day #15    Subjective:      States he feels tired. Abdominal pain mild-moderate, \"generalized\", not worse with activity.    Brady Do **OR** HYDROmorphone HCl (DILAUDID) 1 MG/ML injection 0.4 mg, 0.4 mg, Intravenous, Q2H PRN **OR** HYDROmorphone HCl (DILAUDID) 1 MG/ML injection 0.8 mg, 0.8 mg, Intravenous, Q2H PRN  •  Labetalol HCl (TRANDATE) injection 10 mg, 10 mg, Intravenous, Q6H PRN written informed consent were   obtained. Time-out was performed by the staff. He was positioned supine. Preliminary scans were obtained.   This demonstrated targeted cavity to be collapsed, with no measurable residual.  There is still abnormality in orientation questions today but he reports his mind is still \"foggy\". Psychiatric: Mood flat, ? depressed  Skin: Pale, warm and dry.     Palliative Performance Scale: 60%    Palliative Performance Scale   % Ambulation Activity Level Self-Care Intake Con agreeable as well. IBRAHIMA order placed and d/w Keith Last RN Residential liaison. Met with Madiha Uriarte and he stated he was \"tired\", overall pain controlled and not a barrier to functioning or tolerating TF.  Discussed that despite the TF meeting his nutritional need goals of care      Palliative Care Recommendations/Plan:     1. Goals of care: Continue present medical management for now. Dobhoff to continue per surgery recs as still not taking in meaningful PO.  Tolerating TF at goal.   Patient and Wife requesting hosp

## 2020-11-07 NOTE — PROGRESS NOTES
Pt A&Ox2-3 Room Air  Resting in bed  Meds given per Mar  C/O Abdominal pain PRN given  NGT placed, Left Nare  Vital AF 1.2 Infusing 75 at goal  Levin in place  Safety precaution maintained  Will continue to monitor

## 2020-11-07 NOTE — PROGRESS NOTES
Assumed care in AM ,Fatigued , unsteady , assisted to BR , passes unformed stool rectally , Ileostomy red w/ stool present , declines food , states no appetite , Dobhoff tube feedings  Vital af , 1.2 infusing at 75 cc/hr w/ scheduled programed water flushe

## 2020-11-07 NOTE — PLAN OF CARE
Asleep in bed,appearing comfortable. Not having SOB but desating at 89% on RA. O2 at 2L per NC applied. NG tube feeding infusing & tolerated well,no vomiting noted with zero residuals. Levin noted w/ adequate urine output,urine dark colored. Levin care  & il

## 2020-11-07 NOTE — PROGRESS NOTES
[facility]  Progress Note    Ronold Foot Patient Status:  Inpatient    1950 MRN [id number]   [facility] 4NW-A Attending Reyna Hubbard MD   [facility] Day # 16 PCP Jaxon Vazquez MD     Subjective:  Confused this am. Mary Lorenzo well to make it more tolerable. 1. Rectal cancer  -Widely metastatic  -Bryan of disease is low  -No active therapy as inpatient  -Consider further therapy pending recovery    2.  Appendicitis  -Ruptured status post resection  -Continue to advance diet a

## 2020-11-07 NOTE — PLAN OF CARE
More awake & interactive this morning. Not complaining so much of pain on the abdomen. NO SOB,on RA w/ O2 sats at mid 90s. Refuses to eat despite encouragement. Claims he is not feeling full from NG tube feedings but don't feel like eating.  Due IV antibiot

## 2020-11-07 NOTE — PROGRESS NOTES
[facility] Hospitalist Progress Note     [facility]      SUBJECTIVE:  Little pain, little nausea. No f/c.  COLLEEN Levin    OBJECTIVE:  Temp:  [97.3 °F (36.3 °C)-98.3 °F (36.8 °C)] 98.2 °F (36.8 °C)  Pulse:  [72-79] 75  Resp:  [17-21] 18  BP: (107-134)/(67-81) is ascites diffusely in abdomen with a more focal peripherally enhancing fluid collection near inferior tip right hepatic lobe that could be an abscess. 2. Diffuse hepatic metastases and diffuse metastases in lung bases.  3. Marked bowel wall thickening and 2 mg, Oral, Nightly PRN    •  cefTRIAXone Sodium (ROCEPHIN) 2 g in sodium chloride 0.9% 100 mL MBP/ADD-vantage, 2 g, Intravenous, Q24H    •  metRONIDAZOLE in NaCl (FLAGYL) IVPB premix 500 mg, 500 mg, Intravenous, Q8H    •  HYDROmorphone HCl (DILAUDID) 1 MG Jackie this afternoon  - GI c/s for evaluation -- EGD 11/2 without clear etiology of pain.  NG placed with procedure and patient tolerating TF so far  - nutrition c/s for TPN  - advance TF to goal to make sure patient tolerating, and can wean TPN when T

## 2020-11-07 NOTE — PROGRESS NOTES
[facility]  Progress Note    Liam Mccabe Patient Status:  Inpatient    1950 MRN [id number]   [facility] 4NW-A Attending Elaina Pavon MD   [facility] Day # 16 PCP Carlitos Pena MD     Subjective:  Liam Mccabe is a(n)

## 2020-11-08 NOTE — PLAN OF CARE
A/o x2-3, forgetful, fatigue. Desats to 87% on room air. Oxygen saturation above 93% on 2 LNC. Still c/o upper abdominal pain requesting iv dilaudid. Declined norco. Right lower abdomen ostomy clean, dry and intact with watery stool noted.  Afebrile overnig

## 2020-11-08 NOTE — PROGRESS NOTES
[facility]  Progress Note    Kp Baeza Patient Status:  Inpatient    1950 MRN [id number]   [facility] 4NW-A Attending Marty Olmstead MD   [facility] Day # 16 PCP Brian Bridges MD     Subjective:  Kp Baeza is a(n)

## 2020-11-08 NOTE — PROGRESS NOTES
[facility] Hospitalist Progress Note     [facility]      SUBJECTIVE:  Laying in bed, tired. Some pain. Breathing ok. TF. Levin in place.  Not taking PO    OBJECTIVE:  Temp:  [97.5 °F (36.4 °C)-98.3 °F (36.8 °C)] 97.5 °F (36.4 °C)  Pulse:  [74-78] 77  Resp: Abdomen+pelvis(contrast Only)(cpt=74177)    Result Date: 10/28/2020  CONCLUSION:  1. Interval appendectomy.   There is ascites diffusely in abdomen with a more focal peripherally enhancing fluid collection near inferior tip right hepatic lobe that could be mg, Intravenous, Q6H PRN    •  tamsulosin HCl (FLOMAX) cap 0.4 mg, 0.4 mg, Oral, Daily    •  melatonin tab TABS 2 mg, 2 mg, Oral, Nightly PRN    •  cefTRIAXone Sodium (ROCEPHIN) 2 g in sodium chloride 0.9% 100 mL MBP/ADD-vantage, 2 g, Intravenous, Q24H Malnutrition  - continues to have pain with eating / drinking -- only able to do sips with meds  - discussed with Dr. Adrian Martinez this afternoon  - GI c/s for evaluation -- EGD 11/2 without clear etiology of pain.  NG placed with procedure and patient tolera

## 2020-11-09 PROCEDURE — 99233 SBSQ HOSP IP/OBS HIGH 50: CPT | Performed by: NURSE PRACTITIONER

## 2020-11-09 RX ORDER — MIRTAZAPINE 15 MG/1
15 TABLET, FILM COATED ORAL NIGHTLY
Qty: 30 TABLET | Refills: 0 | Status: SHIPPED | OUTPATIENT
Start: 2020-11-09

## 2020-11-09 RX ORDER — HYDROCODONE BITARTRATE AND ACETAMINOPHEN 10; 325 MG/1; MG/1
1 TABLET ORAL EVERY 4 HOURS PRN
Qty: 30 TABLET | Refills: 0 | Status: SHIPPED | OUTPATIENT
Start: 2020-11-09

## 2020-11-09 RX ORDER — MAGNESIUM OXIDE 400 MG (241.3 MG MAGNESIUM) TABLET
TABLET
Qty: 30 TABLET | Refills: 0 | Status: SHIPPED | OUTPATIENT
Start: 2020-11-09

## 2020-11-09 RX ORDER — PANTOPRAZOLE SODIUM 40 MG/1
40 TABLET, DELAYED RELEASE ORAL DAILY
Qty: 30 TABLET | Refills: 0 | Status: SHIPPED | OUTPATIENT
Start: 2020-11-09

## 2020-11-09 RX ORDER — TAMSULOSIN HYDROCHLORIDE 0.4 MG/1
0.4 CAPSULE ORAL DAILY
Qty: 30 CAPSULE | Refills: 0 | Status: SHIPPED | OUTPATIENT
Start: 2020-11-10 | End: 2020-12-10

## 2020-11-09 NOTE — PLAN OF CARE
Problem: Impaired Activities of Daily Living  Goal: Achieve highest/safest level of independence in self care  Description: Interventions:  - Assess ability and encourage patient to participate in ADLs to maximize function  - Promote sitting position [facility]

## 2020-11-09 NOTE — PHYSICAL THERAPY NOTE
Attempted to see pt at [phone], pt politely refusing PT session at this time.  Educated pt on benefit of being up out of bed and encouraged him to get up to the chair for meals at least. Offered to trial the stoop for stair practice in the room, pt continues t

## 2020-11-09 NOTE — CM/SW NOTE
Spoke w/RN regarding pt. She stated the pt would like to see PC today. Family is still not sure if they want PC or hospice at [place].  Sent PC a message asking if they can follow up w/the pt today per family/RN request. SW to remain available and follow up if a

## 2020-11-09 NOTE — PROGRESS NOTES
[facility] Hospitalist Progress Note     [facility]      SUBJECTIVE:  Pt pulled NGT out o/n. Able to keep a little bit down, taking supplements boost. No n/v/f/c. Pain ok. On 2-3 L.  Levin in    OBJECTIVE:  Temp:  [97.6 °F (36.4 °C)-98.8 °F (37.1 °C)] 98.8 Imaging:Ct Abdomen+pelvis(contrast Only)(cpt=74177)    Result Date: 10/28/2020  CONCLUSION:  1. Interval appendectomy.   There is ascites diffusely in abdomen with a more focal peripherally enhancing fluid collection near inferior tip right hepatic lo injection 4 mg, 4 mg, Intravenous, Q6H PRN    •  tamsulosin HCl (FLOMAX) cap 0.4 mg, 0.4 mg, Oral, Daily    •  melatonin tab TABS 2 mg, 2 mg, Oral, Nightly PRN    •  cefTRIAXone Sodium (ROCEPHIN) 2 g in sodium chloride 0.9% 100 mL MBP/ADD-vantage, 2 g, Int eating / drinking -- only able to do sips with meds  - discussed with Dr. Maksim Patel this afternoon  - GI c/s for evaluation -- EGD 11/2 without clear etiology of pain.  NG placed with procedure and patient tolerating TF so far  - nutrition c/s for TPN  - a

## 2020-11-09 NOTE — PROGRESS NOTES
[facility]  Progress Note    Leyla Hagan Patient Status:  Inpatient    1950 MRN [id number]   [facility] 4NW-A Attending Paz Lopez MD   [facility] Day # 18 PCP Margaret Serrano MD     Subjective:    Patients ng tube came

## 2020-11-09 NOTE — PLAN OF CARE
Patient A/O X 3. VSS. NG removed last night, ordering diet tray. Awaiting palliative care and hospice to see.      Problem: PAIN - ADULT  Goal: Verbalizes/displays adequate comfort level or patient's stated pain goal  Description: INTERVENTIONS:  - Encourag RN  Outcome: Progressing  11/9/2020 1032 by Ammy Johnson RN  Outcome: Progressing     Problem: DISCHARGE PLANNING  Goal: Discharge to home or other facility with appropriate resources  Description: INTERVENTIONS:  - Identify barriers to discharge w/pt and function  - Maintain adequate hydration with IV or PO as ordered and tolerated  - Evaluate effectiveness of GI medications  - Encourage mobilization and activity  - Obtain nutritional consult as needed  - Establish a toileting routine/schedule  - Consider and eventually discharge home. Interventions:  Monitor for any s/s of infection and report. Continue IVF for hydration. Continue antibiotics to treat for infection.     Encourage deep breathing, coughing exercises and IS use to prevent complications lik noise and interruptions  - Encourage family to assist in orientation and promotion of home routines  11/9/2020 1032 by Ammy Johnson RN  Outcome: Progressing  11/9/2020 1032 by Ammy Johnson, RN  Outcome: Progressing     Problem: Impaired Functional Mobilit

## 2020-11-09 NOTE — PROGRESS NOTES
Dr. Reyes Prince ok with d/c to hospice tomorrow, patient may eat whatever he wants. Spoke with Dr. Komal English- DNR signed. Ok to D/c tomorrow. May D/c Angel and attempt voiding trial, if unable to void may put back angel.    Spoke with Infectious disease GUS torres

## 2020-11-09 NOTE — DIETARY NOTE
[facility]    NUTRITION FOLLOW UP ASSESSMENT    NUTRITION DIAGNOSIS/PROBLEM:  Inadequate energy intake related to decreased ability to consume sufficient energy intake and physiologic causes increasing nutrient needs (CA, s/p GI surgery) as evidenced conversation with RN. Plan is to wean TPN as TF increases to goal. Nutrition plan discussed with Sania Rubalcava, Surgery PA and with Dr. Deanne Cordero.  Pt with normal EGD and NG placed on 11/2.     11/1- Received nutrition consult and call from Dr. Deanne Cordero to start T typhlitis. ANTHROPOMETRICS:  Ht: 172.7 cm (5' 8\")  Wt: 77.4 kg (170 lb 9.6 oz). This is 98% of IBW  BMI: Body mass index is 25.94 kg/m². IBW: 70 kg    WEIGHT HISTORY: Wt trending up. Patient Weight for the past 72 hrs:   Weight   10/22/20 1600 70. 9

## 2020-11-09 NOTE — PROGRESS NOTES
INFECTIOUS DISEASE PROGRESS NOTE    Luis E Gonzales Patient Status:  Inpatient    1950 MRN [id number]   [facility] 4SW-A Attending Conchetta [place], *   Hosp Day # 18 PCP Nguyen Keller negatives in the the HPI. Physical Exam:    General: No acute distress. Alert. Resting comfortably in bed on NC. Vital signs: Blood pressure 96/55, pulse 67, temperature 97.9 °F (36.6 °C), temperature source Oral, resp.  rate 15, height 5' 8\" (1.727 m) on 10/21/20   1. AEROBIC BACTERIAL CULTURE     Status: None    Collection Time: 10/29/20  2:30 PM    Specimen: Abdominal fluid;  Abscess   Result Value Ref Range    Aerobic Culture Result No Growth 3 Days N/A    Aerobic Smear No WBCs seen N/A    Aerobic Sme fluid collection, ductal dilatation, or atrophy. SPLEEN: No enlargement or focal lesion. KIDNEYS: No mass, obstruction, or calcification. ADRENALS: No mass or enlargement.    AORTA/VASCULAR: Aorta is atherosclerotic but not aneurysmal.   RETROPERITONE enhancement involving the ileum diffusely. There is a right lower quadrant ostomy noted. This most likely represents diffuse enteritis. 4. Rectal wall thickening is consistent with known history of rectal cancer.    5. Moderate bilateral pleural effusions

## 2020-11-09 NOTE — PROGRESS NOTES
[facility]  GI Progress Note      Zeynep Watt Patient Status:  Inpatient    1950 MRN [id number]   [facility] 4NW-A Attending Nelda Eduardo MD   [facility] Day # 25 PCP Astrid Red MD          SUBJECTIVE:     NG pulled

## 2020-11-09 NOTE — PROGRESS NOTES
[address] [address] [address] Follow Up    Horace Quintana Patient Status:  Inpatient    1950 MRN [id number]   [facility] 4NW-A Attending Delaney Paige MD   [facility] Day # 25 PCP Luci Felix MD     Date of visit:   supplemental per NC.  NEUROLOGIC: alert and oriented to person, place, situation. PSYCHIATRIC:  Mood / affect aligns with situation. SKIN: Warm and dry.      Palliative Performance Scale: 60%   Palliative Performance Scale   % Ambulation Activity Level S has felt tired for over 20 years, and not exactly related to his cancer, but his fatigue is becoming more of a limiting factor. So is his lack of appetite. So, he is unsure how pursuing aggressive therapy and rehab stay will go.   We also considered what hi Recommend PO prior to IV if able to tolerate for longer acting relief. Especially if GOC is to pursue GARY for more participation with PT/OT and eating meals. Goals of Care:  • Considering options - palliative vs hospice. Residential following.   • Await

## 2020-11-09 NOTE — HOSPICE RN NOTE
Met with Reginald Strong and wife Lele Parisi to answer questions about hospice. Reginald Strong is ready to move forward with hospice. Consents were signed. Plan is for d/c in the am between 8:30a and 9am which was reviewed with his nurse.   Please send ileostomy supplies to [facility]

## 2020-11-09 NOTE — CM/SW NOTE
Care Progression Note:  Active Acute Medical Issue:   77 y/o male with metastatic rectal cancer with acute appendicitis complicated by perforation, s/p dx lap, exp lap ileo cecetomy, creation of ileostomy, s/p IR drain of intra-abd fluid collection, which

## 2020-11-09 NOTE — OCCUPATIONAL THERAPY NOTE
OCCUPATIONAL THERAPY QUICK EVALUATION - INPATIENT    Room Number: 403/403-A  Evaluation Date: 11/9/2020     Type of Evaluation: Initial  Presenting Problem:  LETY    Physician Order: IP Consult to Occupational Therapy  Reason for Therapy:  ADL/IADL Dysfuncti ASSESSMENT  Upper extremity ROM is within functional limits     Upper extremity strength is within functional limits     NEUROLOGICAL FINDINGS                   ACTIVITY TOLERANCE                         O2 SATURATIONS                ACTIVITIES OF DAILY LI occupational profile noted above. Functional outcome measures completed include AMPAC, MMT, ROM. In this OT evaluation patient presents with the following performance deficits: endurance, safety, pain, activity tolerance, mobility, self-care.  These deficit

## 2020-11-09 NOTE — PLAN OF CARE
Forgetful at times. NG tube secured, no residual, tolerated tube- feeding well. Took pills with water without difficulty. Prn norco given for abdominal pain with relief. Ileostomy intact draining watery stool. No acute event overnight. Slept well.  Bed alar

## 2020-11-09 NOTE — PROGRESS NOTES
[facility]  Progress Note    Rosemary Head Patient Status:  Inpatient    1950 MRN [id number]   [facility] 4NW-A Attending Eileen Henning MD   [facility] Day # 25 PCP Taya Valles MD     Subjective:    Recovering from surgery

## 2020-11-10 VITALS
BODY MASS INDEX: 25.86 KG/M2 | RESPIRATION RATE: 16 BRPM | HEART RATE: 79 BPM | SYSTOLIC BLOOD PRESSURE: 144 MMHG | TEMPERATURE: 98 F | HEIGHT: 68 IN | WEIGHT: 170.63 LBS | OXYGEN SATURATION: 86 % | DIASTOLIC BLOOD PRESSURE: 80 MMHG

## 2020-11-10 NOTE — PLAN OF CARE
A/ o x3, forgetful. 2000- unable to urinate yet after angel removal. Bladder scan 69 ml. Patient denies discomfort. Denies angel insertion at this time. Will monitor. Pain control with prn norco. Encourage PO intake. Slept well.  Plan to discharge home wit

## 2020-11-10 NOTE — CM/SW NOTE
Patient and patient's spouse provided with discharge instructions, education, and follow up information. Printed prescriptions for Hydrocodone/acetomenophen given to patient's spouse with additional prescriptions sent electronically to patient's pharmacy.

## 2020-11-10 NOTE — DISCHARGE SUMMARY
[facility] Internal Medicine Discharge Summary   Patient ID:  Jay Jay José  [id number]  68 year old  9/2/1950    Admit date: 10/21/2020    Discharge date and time: 11/10/2020     Attending Physician: Horace Ludwig    Primary Care Physician: Elicia Wheeler management per surgery -->removed 10/26  - repeat CT 10/28 with concern for abscess -- drained 10/29 with IR. Also concern for inflammation of ileum on CT imaging.  IR drain removed 10/30, only serous fluid drained, cultures negative for organism     # Abdo alert and oriented x3, no focal neurologic deficits  HEENT: sclera anicteric, oral mucosa moist  Pulm: Lungs clear, normal respiratory effort  CV: Heart with regular rate and rhythm, no peripheral edema  Abd: Abdomen soft, nontender, nondistended, no organ Tabs  · mirtazapine 15 MG Tabs  · Pantoprazole Sodium 40 MG Tbec  · tamsulosin HCl 0.4 MG Caps     You can get these medications from any pharmacy    Bring a paper prescription for each of these medications  · HYDROcodone-acetaminophen  MG Tabs The largest lesion is in segment 7 and 8 and measures 12.8 by 9 cm. This is unchanged. There is an exophytic cystic lesion inferior tip of right hepatic lobe which is unchanged.  BILIARY:  There is gallbladder wall thickening which is a nonspecific findin CONCLUSION:  1. Interval appendectomy. There is ascites diffusely in abdomen with a more focal peripherally enhancing fluid collection near inferior tip right hepatic lobe that could be an abscess.  2. Diffuse hepatic metastases and diffuse meta differences in technique. Numerous other hepatic lesions are again noted. These appear grossly stable. The spleen, pancreas, adrenal glands and kidneys also appear essentially stable. A probable cyst is noted of the right kidney measuring up to 2.4 cm. prior exam although differential considerations from the prior exam are unchanged and continued tension in this region on follow-up studies is suggested. Please refer to recent CT for further details.   Numerous other findings including areas of metastatic tolerated the procedure. No immediate complication. Dose reduction techniques were used. Dose information is transmitted to the [facility] [employer] of Radiology) NR[facility] ([address]) which includes the Dose Index Registry. to the collapsed cavity. The catheter was subsequently removed. Sterile dressings were placed. He tolerated the procedure. No immediate complication. Dose reduction techniques were used.  Dose information is transmitted to the [facility] [employer] of suspicious mass or hydronephrosis. Is ADRENALS:  No mass or enlargement. AORTA/VASCULAR:  No aneurysm or dissection. The celiac artery, SMA, ZACH are patent. Mild ectasia of the common iliac arteries unchanged. RETROPERITONEUM:  No mass or adenopathy. effusions slightly larger on the left, not significantly changed on the right. OTHER:  Negative. CONCLUSION:  1. Marked increase in the amount of ascites. 2. There is again noted to wall thickening involving the left colon to the rectum.   There

## 2021-09-08 NOTE — PLAN OF CARE
Patient Education   Personalized Prevention Plan  You are due for the preventive services outlined below.  Your care team is available to assist you in scheduling these services.  If you have already completed any of these items, please share that information with your care team to update in your medical record.  Health Maintenance Due   Topic Date Due     ANNUAL REVIEW OF HM ORDERS  Never done     COVID-19 Vaccine (1) Never done     Eye Exam  08/22/2017     Zoster (Shingles) Vaccine (2 of 2) 04/14/2020     Flu Vaccine (1) 09/01/2021     FALL RISK ASSESSMENT  09/04/2021     Annual Wellness Visit  09/04/2021         Problem: Impaired Functional Mobility  Goal: Achieve highest/safest level of mobility/gait  Description: Interventions:  - Assess patient's functional ability and stability  - Promote increasing activity/tolerance for mobility and gait  - Educate and eng

## 2023-11-23 NOTE — PROGRESS NOTES
[facility]  Progress Note    Gil Robles Patient Status:  Inpatient    1950 MRN [id number]   [facility] 4NW-A Attending Raimundo Landon,    Hosp Day # 1 PCP Baljit Young MD     Subjective:  Gil Robles is a(n) 6 [facility] Hospital Medicine Discharge Summary    Admit Date: 11/20/2023  Discharge Date and Time: 11/23/20236:47 AM  Admitting Physician: Hospitalist team   Discharging Physician: Soto Leonard MD.  Primary Care Physician: Christine Primary Doctor      Discharge Diagnoses:  Neutropenic fever  Pancytopenia requiring transfusions  History of CML and hypertension    Hospital Course:   27 y.o. Black or  male with a past medical history of hypertension and CML followed by Dr. Hogan undergoing daily oral chemotherapy with his last dose on 11/19/2023. The patient presented to [facility] on 11/20/2023 with a primary complaint of subjective fever for the last two days.  Patient reports having shortness a breath with movement and right-sided chest pain without radiation described as sharp/stabbing in nature, cough with intermittent sputum production and urinary frequency.  He denies complaints burning with urination, nausea, vomiting, diarrhea and exposure sick contacts.  Patient reports on 11/13/2023 he required 2 units transfusion of platelets.     Upon presentation to the ED, temperature 101.8° F, heart rate 112, blood pressure 136/78, respiratory rate 20 and SpO2 100% on room air.  Labs with WBC 1.55, H&H 6.9/19.7, platelets 3, lactic acid 1.1.  Influenza a and B and SARS-CoV-2 PCR negative. Strep a PCR negative.  Chest x-ray with no acute cardiopulmonary abnormality.  Oncology was consulted and recommended transfusing 2 units of platelets and 1 unit of leuko reduced packed red blood cells in addition to starting cefepime.  Patient was admitted to hospital medicine services for further medical management.  Patient continues to have fevers despite broad-spectrum antibiotics.  He was placed on vancomycin and cefepime.  Fevers getting as high as 103.  Overall though he was feeling okay.  He was ambulatory and tolerating p.o..  Did require several transfusions of both platelets and red  "blood cells during his stay.  Add conversations regarding the patient's care with his oncologist.  Patient has very advanced stage CML and a very poor long-term prognosis.  After continued conversations with the patient he wishes to leave the hospital AMA today so he has been Thanksgiving with his family.  I discussed with him the risk of leaving the hospital including death secondary to anemia and low platelets as well as his fevers.  Encouraged him to stay away from any sick contacts.  Also discussed with them to check in with his oncologist tomorrow and on Monday.  He will need to continue his prophylactic antibiotics as an outpatient.  Although he was not ideal for the patient to leave the hospital with continued fevers and neutropenia this may be 1 of his last holidays with the family understand his Sinemet.  Will do whatever we can to assist him in the outpatient setting.  Patient has significant other is at the bedside for our conversations on plan of care.  Patient voices understanding and agreement.    Vitals:  Blood pressure 137/82, pulse 72, temperature 97.9 °F (36.6 °C), temperature source Oral, resp. rate 20, height 6' 0.01" (1.829 m), weight 86.2 kg (189 lb 15.9 oz), SpO2 96 %..    Physical Exam:  Awake, Alert, Oriented x 3, No new focal Neurologic deficit, Normal Affect  NC/AT, PERRLA, EOMI  Supple neck, no JVD, No cervical lymphadenopathy  Symmetrical chest, Good air entry bilaterally. No rhonchi, wheezes, crackles appreciated  RRR, No gallop, rub or murmur  +ve Bowel sounds x4, Abd soft and non tender, no rebound, guarding or rigidity  No Cyanosis, cludding or edema, No new rash or bruises    Procedures Performed: No admission procedures for hospital encounter.     Significant Diagnostic Studies: See Full reports for all details  No results displayed because visit has over 200 results.           Microbiology Results (last 7 days)       Procedure Component Value Units Date/Time    Blood Culture #2 " mixed plaque in the aorta and iliac arteries. RETROPERITONEUM:  Unremarkable. BOWEL/MESENTERY:  Small hiatal hernia. The appendix is dilated and filled with multiple appendicoliths.   The appendix measures up to 2.5 cm in diameter, previously measuring u **CANNOT BE ORDERED STAT** [[id number]]  (Normal) Collected: 11/20/23 0940    Order Status: Completed Specimen: Blood Updated: 11/22/23 1400     CULTURE, BLOOD ([facility]) No Growth At 48 Hours    Blood Culture #1 **CANNOT BE ORDERED STAT** [[id number]]  (Normal) Collected: 11/20/23 0940    Order Status: Completed Specimen: Blood Updated: 11/22/23 1101     CULTURE, BLOOD ([facility]) No Growth At 48 Hours             X-Ray Chest 1 View    Result Date: 11/20/2023  EXAMINATION: XR CHEST 1 VIEW CLINICAL HISTORY: fever; TECHNIQUE: Single frontal view of the chest was performed. COMPARISON: 07/04/2023 FINDINGS: LINES AND TUBES: None MEDIASTINUM AND SONYA: The cardiac silhouette is normal. LUNGS: No lobar consolidation. No edema. PLEURA:No pleural effusion. No pneumothorax. OTHER: No acute osseous abnormality.     No acute cardiopulmonary abnormality. Electronically signed by: Kya Dolan Date:    11/20/2023 Time:    09:32  - pulls last radiology orders        Medication List        CONTINUE taking these medications      acyclovir 800 MG Tab  Commonly known as: ZOVIRAX  Take 1 tablet (800 mg total) by mouth 2 (two) times daily.     ICLUSIG 45 mg Tab tablet  Generic drug: PONATinib  Take 1 tablet (45 mg total) by mouth once daily.     levoFLOXacin 500 MG tablet  Commonly known as: LEVAQUIN  Take 1 tablet (500 mg total) by mouth once daily.     NIFEdipine 60 MG ([facility]) 24 hr tablet  Commonly known as: PROCARDIA-XL  Take 1 tablet (60 mg total) by mouth once daily.     pantoprazole 40 MG tablet  Commonly known as: PROTONIX  Take 1 tablet (40 mg total) by mouth once daily.     sulfamethoxazole-trimethoprim 800-160mg 800-160 mg Tab  Commonly known as: BACTRIM DS  Take 1 tablet by mouth every Mon, Wed, Fri.     VENCLEXTA 50 mg Tab  Generic drug: venetoclax  Take 1 tablet (50 mg) by mouth once daily on days 1 through 10 only of each 28 day chemotherapy cycle.               Explained in detail to the patient about the discharge plan,  There is mild to moderate thickening of the rectosigmoid colon with underlying nonspecific colitis and/or neoplasm not entirely excluded. Clinical correlation recommended. 3. Urinary bladder wall thickening is concerning for cystitis.   Clinical correl medications, and follow-up visits. Pt understands and agrees with the treatment plan  Discharged Condition: stable  Diet: regular  Disposition: AMA/home    Medications Per DC med rec  Activities as tolerated  Follow up with your PCP in 2 wks   For further questions contact hospitalist office    Discharge time 33 minutes    For worsening symptoms, chest pain, shortness of breath, increased abdominal pain, high grade fever, stroke or stroke like symptoms, immediately go to the nearest Emergency Room or call 911 as soon as possible.        Soto Junior M.D, on 11/23/2023. at 6:47 AM.

## 2024-04-22 NOTE — PROGRESS NOTES
Problem: Balance  Goal: LTG - Patient will demonstrate Intervention to enhance balance for safe completion of daily activities  Outcome: Progressing  Goal: LTG - Patient will maintain balance to allow for safe mobility  Outcome: Progressing     Problem: Mobility  Goal: LTG - Patient will ambulate household distance with RW at a modified independent level.    Outcome: Progressing  Goal: LTG - Patient will navigate 2 steps with RW at a modified independent level.    Outcome: Progressing     Problem: Safety  Goal: LTG - Patient will demonstrate safety requirements appropriate to situation/environment  Outcome: Progressing     Problem: PT Transfers  Goal: LTG - Patient will demonstrate safe transfer techniques  Outcome: Progressing     Problem: Pain  Goal: LTG - Patient will manage pain with the appropriate technique/Intervention  Outcome: Progressing     Problem: Compromised Skin Integrity  Goal: LTG - Patient will be free from infection  Outcome: Progressing      INFECTIOUS DISEASE PROGRESS NOTE    Constance Song Patient Status:  Inpatient    1950 MRN [id number]   [facility] 4SW-A Attending Ami Quiros, *   Hosp Day # 13 PCP Tatum Toney 75, temperature 97.9 °F (36.6 °C), temperature source Oral, resp. rate 18, height 5' 8\" (1.727 m), weight 168 lb (76.2 kg), SpO2 93 %. HEENT: no scleral icterus or conjunctival injection. Moist mucous membranes. No thrush. + NG tube in place.   Neck: No l fluid;  Abscess   Result Value Ref Range    Aerobic Culture Result No Growth 3 Days N/A    Aerobic Smear No WBCs seen N/A    Aerobic Smear No organisms seen N/A   2. URINE CULTURE, ROUTINE     Status: None    Collection Time: 10/22/20  4:30 AM    Specimen: or calcification. ADRENALS: No mass or enlargement. AORTA/VASCULAR: Aorta is atherosclerotic but not aneurysmal.   RETROPERITONEUM: No mass or adenopathy. BOWEL/MESENTERY: There has been interval appendectomy.  There is a right lower quadrant ileostom enteritis. 4. Rectal wall thickening is consistent with known history of rectal cancer. 5. Moderate bilateral pleural effusions have increased since previous study.        Dictated by (CST): Lauri Benavides MD on 10/28/2020 at 2:53 PM   Finalized by (CST)

## 2024-10-06 NOTE — PLAN OF CARE
Forgetful at times  Tube feeds infusing no complications, no residual   Per GI ok to dc when taking adequate PO per dietary  No TPN  Illeostomy draining with no complications  Levin catheter draining with no complications  PTOT   Plan home with pallative v [facility] Walk in

## 2024-11-06 NOTE — PROGRESS NOTES
10/22/20 0806   Clinical Encounter Type   Visited With Health care provider  (ENZO Montilla)   Routine Visit   (Responded to POLST consult)   Continue Visiting   (Encouraged RN to page  at pager # [phone] when the patient is ready to complete the form) (1) More than 48 hours/None

## (undated) DEVICE — STANDARD HYPODERMIC NEEDLE,POLYPROPYLENE HUB: Brand: MONOJECT

## (undated) DEVICE — CHLORAPREP 26ML APPLICATOR

## (undated) DEVICE — CAUTERY PENCIL

## (undated) DEVICE — Device

## (undated) DEVICE — SOL  .9 1000ML BTL

## (undated) DEVICE — 450 ML BOTTLE OF 0.05% CHLORHEXIDINE GLUCONATE IN 99.95% STERILE WATER FOR IRRIGATION, USP AND APPLICATOR.: Brand: IRRISEPT ANTIMICROBIAL WOUND LAVAGE

## (undated) DEVICE — SOL  .9 3000ML

## (undated) DEVICE — THE ECHELON, ECHELON ENDOPATH™ AND ECHELON FLEX™ FAMILIES OF ENDOSCOPIC LINEAR CUTTERS AND RELOADS ARE STERILE, SINGLE PATIENT USE INSTRUMENTS THAT SIMULTANEOUSLY CUT AND STAPLE TISSUE. THERE ARE SIX STAGGERED ROWS OF STAPLES, THREE ON EITHER SIDE OF THE CUT LINE. THE 45 MM INSTRUMENTS HAVE A STAPLE LINE THATIS APPROXIMATELY 45 MM LONG AND A CUT LINE THAT IS APPROXIMATELY 42 MM LONG. THE SHAFT CAN ROTATE FREELY IN BOTH DIRECTIONS AND AN ARTICULATION MECHANISM ON ARTICULATING INSTRUMENTS ENABLES BENDING THE DISTAL PORTIONOF THE SHAFT TO FACILITATE LATERAL ACCESS OF THE OPERATIVE SITE.THE INSTRUMENTS ARE SHIPPED WITHOUT A RELOAD AND MUST BE LOADED PRIOR TO USE. A STAPLE RETAINING CAP ON THE RELOAD PROTECTS THE STAPLE LEG POINTS DURING SHIPPING AND TRANSPORTATION. THE INSTRUMENTS’ LOCK-OUT FEATURE IS DESIGNED TO PREVENT A USED RELOAD FROM BEING REFIRED.: Brand: ECHELON ENDOPATH

## (undated) DEVICE — KENDALL SCD EXPRESS SLEEVES, KNEE LENGTH, MEDIUM: Brand: KENDALL SCD

## (undated) DEVICE — SUTURE SILK 2-0 FSL

## (undated) DEVICE — INSUFFLATION NEEDLE TO ESTABLISH PNEUMOPERITONEUM.: Brand: INSUFFLATION NEEDLE

## (undated) DEVICE — DISSECTOR SONICISION CORDLESS

## (undated) DEVICE — DRESSING AQUACEL AG 3.5X12

## (undated) DEVICE — SUTURE SILK 2-0 SH

## (undated) DEVICE — SUTURE SILK 3-0 SH

## (undated) DEVICE — HEMOCLIP HORIZON LG 004200

## (undated) DEVICE — VIOLET BRAIDED (POLYGLACTIN 910), SYNTHETIC ABSORBABLE SUTURE: Brand: COATED VICRYL

## (undated) DEVICE — BULB SYRINGE,IRRIGATION WITH PROTECTIVE CAP: Brand: DOVER

## (undated) DEVICE — PROXIMATE RELOADABLE LINEAR CUTTER WITH SAFETY LOCK-OUT, 75MM: Brand: PROXIMATE

## (undated) DEVICE — TISSUE RETRIEVAL SYSTEM: Brand: INZII RETRIEVAL SYSTEM

## (undated) DEVICE — THE ECHELON FLEX POWERED PLUS ARTICULATING ENDOSCOPIC LINEAR CUTTERS ARE STERILE, SINGLE PATIENT USE INSTRUMENTS THAT SIMULTANEOUSLYCUT AND STAPLE TISSUE. THERE ARE SIX STAGGERED ROWS OF STAPLES, THREE ON EITHER SIDE OF THE CUT LINE. THE ECHELON FLEX 45 POWERED PLUSINSTRUMENTS HAVE A STAPLE LINE THAT IS APPROXIMATELY 45 MM LONG AND A CUT LINE THAT IS APPROXIMATELY 42 MM LONG. THE SHAFT CAN ROTATE FREELYIN BOTH DIRECTIONS AND AN ARTICULATION MECHANISM ENABLES THE DISTAL PORTION OF THE SHAFT TO PIVOT TO FACILITATE LATERAL ACCESS TO THE OPERATIVESITE.THE INSTRUMENTS ARE PACKAGED WITH A PRIMARY LITHIUM BATTERY PACK THAT MUST BE INSTALLED PRIOR TO USE. THERE ARE SPECIFIC REQUIREMENTS FORDISPOSING OF THE BATTERY PACK. REFER TO THE BATTERY PACK DISPOSAL SECTION.THE INSTRUMENTS ARE PACKAGED WITHOUT A RELOAD AND MUST BE LOADED PRIOR TO USE. A STAPLE RETAINING CAP ON THE RELOAD PROTECTS THE STAPLE LEGPOINTS DURING SHIPPING AND TRANSPORTATION. THE INSTRUMENTS’ LOCK-OUT FEATURE IS DESIGNED TO PREVENT A USED OR IMPROPERLY INSTALLED RELOADFROM BEING REFIRED OR AN INSTRUMENT FROM BEING FIRED WITHOUT A RELOAD.: Brand: ECHELON FLEX

## (undated) DEVICE — LAP CHOLE/APPY CDS-LF: Brand: MEDLINE INDUSTRIES, INC.

## (undated) DEVICE — SYRINGE 20CC LL TIP

## (undated) DEVICE — #10 STERILE BLADE: Brand: POLYMER COATED BLADES

## (undated) DEVICE — LIGASURE IMPACT OPEN DEVICE

## (undated) DEVICE — TROCARS: Brand: KII® BLUNT TIP ACCESS SYSTEM

## (undated) DEVICE — FILTERLINE NASAL ADULT O2/CO2

## (undated) DEVICE — 3M™ RED DOT™ MONITORING ELECTRODE WITH FOAM TAPE AND STICKY GEL, 50/BAG, 20/CASE, 72/PLT 2570: Brand: RED DOT™

## (undated) DEVICE — PROXIMATE SKIN STAPLERS (35 WIDE) CONTAINS 35 STAINLESS STEEL STAPLES (FIXED HEAD): Brand: PROXIMATE

## (undated) DEVICE — TROCAR: Brand: KII® SLEEVE

## (undated) DEVICE — SUTURE MONOCRYL 4-0 PS-2

## (undated) DEVICE — 1200CC GUARDIAN II: Brand: GUARDIAN

## (undated) DEVICE — DRAIN RELIAVAC 100CC

## (undated) DEVICE — MEDI-VAC NON-CONDUCTIVE SUCTION TUBING: Brand: CARDINAL HEALTH

## (undated) DEVICE — LIGHT HANDLE

## (undated) DEVICE — Device: Brand: DEFENDO AIR/WATER/SUCTION AND BIOPSY VALVE

## (undated) DEVICE — DRAIN CHANNEL 19FR BLAKE

## (undated) DEVICE — SUTURE PDS II 1 ST-21

## (undated) DEVICE — POOLE SUCTION HANDLE: Brand: CARDINAL HEALTH

## (undated) DEVICE — POM MASK W/CO2

## (undated) DEVICE — SUTURE VICRYL 2-0 SH

## (undated) DEVICE — PROXIMATE LINEAR CUTTER RELOAD, BLUE, 75MM: Brand: PROXIMATE

## (undated) DEVICE — ENDOSCOPY PACK UPPER: Brand: MEDLINE INDUSTRIES, INC.

## (undated) DEVICE — REM POLYHESIVE ADULT PATIENT RETURN ELECTRODE: Brand: VALLEYLAB

## (undated) DEVICE — TROCAR: Brand: KII FIOS FIRST ENTRY

## (undated) DEVICE — DERMABOND LIQUID ADHESIVE

## (undated) DEVICE — STERILE SYNTHETIC POLYISOPRENE POWDER-FREE SURGICAL GLOVES WITH HYDROGEL COATING, SMOOTH FINISH, STRAIGHT FINGER: Brand: PROTEXIS

## (undated) DEVICE — LAPAROTOMY SPONGE - RF AND X-RAY DETECTABLE PRE-WASHED: Brand: SITUATE

## (undated) NOTE — LETTER
Krystal Yuan 89  [address] [facility] [address] [facility]  Authorization for Surgical Operation and Procedure     Date:___________                                                                                                         Time:__________ 4.   Should the need arise during my operation or immediate post-operative period, I also consent to the administration of blood and/or blood products.   Further, I understand that despite careful testing and screening of blood or blood products by sandor 8.   I recognize that in the event my procedure results in extended X-Ray/fluoroscopy time, I may develop a skin reaction. 9.  If I have a Do Not Attempt Resuscitation (DNAR) order in place, that status will be suspended while in the operating room, proc 1. IJeff agree to be cared for by an anesthesiologist, who is specially trained to monitor me and give me medicine to put me to sleep or keep me comfortable during my procedure    I understand that my anesthesiologist is not an employee or age 5. My doctor has explained to me other choices available to me for my care (alternatives).   6. Pregnant Patients (“epidural”):  I understand that the risks of having an epidural (medicine given into my back to help control pain during labor), include itchi

## (undated) NOTE — LETTER
Krystal Yuan 182  [address] [facility] [address], [address] [facility]  Authorization for Surgical Operation and Procedure     Date:___________                                                                                                         Time:__________ 4.   Should the need arise during my operation or immediate post-operative period, I also consent to the administration of blood and/or blood products.   Further, I understand that despite careful testing and screening of blood or blood products by sandor 8.   I recognize that in the event my procedure results in extended X-Ray/fluoroscopy time, I may develop a skin reaction. 9.  If I have a Do Not Attempt Resuscitation (DNAR) order in place, that status will be suspended while in the operating room, proc 1. ILeyla agree to be cared for by an anesthesiologist, who is specially trained to monitor me and give me medicine to put me to sleep or keep me comfortable during my procedure    I understand that my anesthesiologist is not an employee or age 5. My doctor has explained to me other choices available to me for my care (alternatives).   6. Pregnant Patients (“epidural”):  I understand that the risks of having an epidural (medicine given into my back to help control pain during labor), include itchi

## (undated) NOTE — LETTER
Krystal Yuan 182  [address] [facility] [address], [address] [facility]  Authorization for Surgical Operation and Procedure     Date:___________                                                                                                         Time:__________ 4.   Should the need arise during my operation or immediate post-operative period, I also consent to the administration of blood and/or blood products.   Further, I understand that despite careful testing and screening of blood or blood products by sandor 8.   I recognize that in the event my procedure results in extended X-Ray/fluoroscopy time, I may develop a skin reaction. 9.  If I have a Do Not Attempt Resuscitation (DNAR) order in place, that status will be suspended while in the operating room, proc 1. IAarti agree to be cared for by an anesthesiologist, who is specially trained to monitor me and give me medicine to put me to sleep or keep me comfortable during my procedure    I understand that my anesthesiologist is not an employee or age 5. My doctor has explained to me other choices available to me for my care (alternatives).   6. Pregnant Patients (“epidural”):  I understand that the risks of having an epidural (medicine given into my back to help control pain during labor), include itchi

## (undated) NOTE — LETTER
[address] [facility] FOR BLOOD OR BLOOD COMPONENTS      In the course of your treatment, it may become necessary to administer a transfusion of blood or blood components.  This form provides basic information concerning this proc If loss of blood poses serious threats in the course of your treatment, THERE IS NO EFFECTIVE ALTERNATIVE TO BLOOD TRANSFUSION.  However, if you have any further questions on this matter, your physician will fully explain the alternatives to you if it has n

## (undated) NOTE — LETTER
Krystal Yuan 182  [address] [facility] [address], [address] [facility]  Authorization for Surgical Operation and Procedure     Date:___________                                                                                                         Time:__________ 4.   Should the need arise during my operation or immediate post-operative period, I also consent to the administration of blood and/or blood products.   Further, I understand that despite careful testing and screening of blood or blood products by sandor 8.   I recognize that in the event my procedure results in extended X-Ray/fluoroscopy time, I may develop a skin reaction. 9.  If I have a Do Not Attempt Resuscitation (DNAR) order in place, that status will be suspended while in the operating room, proc 1. IJoseph agree to be cared for by an anesthesiologist, who is specially trained to monitor me and give me medicine to put me to sleep or keep me comfortable during my procedure    I understand that my anesthesiologist is not an employee or age 5. My doctor has explained to me other choices available to me for my care (alternatives).   6. Pregnant Patients (“epidural”):  I understand that the risks of having an epidural (medicine given into my back to help control pain during labor), include itchi

## (undated) NOTE — LETTER
Krystal Yuan 182  [address] [facility] [address], [address] [facility]  Authorization for Surgical Operation and Procedure     Date:___________                                                                                                         Time:__________ 4.   Should the need arise during my operation or immediate post-operative period, I also consent to the administration of blood and/or blood products.   Further, I understand that despite careful testing and screening of blood or blood products by sandor 8.   I recognize that in the event my procedure results in extended X-Ray/fluoroscopy time, I may develop a skin reaction. 9.  If I have a Do Not Attempt Resuscitation (DNAR) order in place, that status will be suspended while in the operating room, proc 1. ISydnee agree to be cared for by an anesthesiologist, who is specially trained to monitor me and give me medicine to put me to sleep or keep me comfortable during my procedure    I understand that my anesthesiologist is not an employee or age 5. My doctor has explained to me other choices available to me for my care (alternatives).   6. Pregnant Patients (“epidural”):  I understand that the risks of having an epidural (medicine given into my back to help control pain during labor), include itchi

## (undated) NOTE — LETTER
Krystal Yuan 182  [address] [facility] [address], [address] [facility]  Authorization for Surgical Operation and Procedure     Date:___________                                                                                                         Time:__________ 4.   Should the need arise during my operation or immediate post-operative period, I also consent to the administration of blood and/or blood products.   Further, I understand that despite careful testing and screening of blood or blood products by sandor 8.   I recognize that in the event my procedure results in extended X-Ray/fluoroscopy time, I may develop a skin reaction. 9.  If I have a Do Not Attempt Resuscitation (DNAR) order in place, that status will be suspended while in the operating room, proc 1. IEdson agree to be cared for by an anesthesiologist, who is specially trained to monitor me and give me medicine to put me to sleep or keep me comfortable during my procedure    I understand that my anesthesiologist is not an employee or age 7. My doctor has explained to me other choices available to me for my care (alternatives).   6. Pregnant Patients (“epidural”):  I understand that the risks of having an epidural (medicine given into my back to help control pain during labor), include itchi

## (undated) NOTE — ED AVS SNAPSHOT
Javier Hernandez   MRN: [id number]    Department:  [facility] Emergency Department   Date of Visit:  5/29/2019           Disclosure     Insurance plans vary and the physician(s) referred by the ER may not be covered by your plan.  Please contact you tell this physician (or your personal doctor if your instructions are to return to your personal doctor) about any new or lasting problems. The primary care or specialist physician will see patients referred from the [facility] Emergency Department.  Jax Kwon

## (undated) NOTE — LETTER
[address] [facility] FOR BLOOD OR BLOOD COMPONENTS      In the course of your treatment, it may become necessary to administer a transfusion of blood or blood components.  This form provides basic information concerning this proc If loss of blood poses serious threats in the course of your treatment, THERE IS NO EFFECTIVE ALTERNATIVE TO BLOOD TRANSFUSION.  However, if you have any further questions on this matter, your physician will fully explain the alternatives to you if it has n